# Patient Record
Sex: FEMALE | Race: WHITE | NOT HISPANIC OR LATINO | Employment: FULL TIME | URBAN - METROPOLITAN AREA
[De-identification: names, ages, dates, MRNs, and addresses within clinical notes are randomized per-mention and may not be internally consistent; named-entity substitution may affect disease eponyms.]

---

## 2024-02-26 ENCOUNTER — HOSPITAL ENCOUNTER (OUTPATIENT)
Dept: RADIOLOGY | Facility: CLINIC | Age: 54
Discharge: HOME | End: 2024-02-26
Payer: COMMERCIAL

## 2024-02-26 DIAGNOSIS — Z12.31 ENCOUNTER FOR SCREENING MAMMOGRAM FOR MALIGNANT NEOPLASM OF BREAST: ICD-10-CM

## 2024-02-26 PROCEDURE — 77067 SCR MAMMO BI INCL CAD: CPT

## 2024-02-26 PROCEDURE — 77063 BREAST TOMOSYNTHESIS BI: CPT | Performed by: RADIOLOGY

## 2024-02-26 PROCEDURE — 77067 SCR MAMMO BI INCL CAD: CPT | Performed by: RADIOLOGY

## 2024-02-29 ENCOUNTER — OFFICE VISIT (OUTPATIENT)
Dept: OBSTETRICS AND GYNECOLOGY | Facility: CLINIC | Age: 54
End: 2024-02-29
Payer: COMMERCIAL

## 2024-02-29 VITALS
HEIGHT: 63 IN | DIASTOLIC BLOOD PRESSURE: 90 MMHG | BODY MASS INDEX: 20.73 KG/M2 | WEIGHT: 117 LBS | SYSTOLIC BLOOD PRESSURE: 181 MMHG

## 2024-02-29 DIAGNOSIS — Z01.419 NORMAL GYNECOLOGIC EXAMINATION: Primary | ICD-10-CM

## 2024-02-29 PROCEDURE — 1036F TOBACCO NON-USER: CPT | Performed by: OBSTETRICS & GYNECOLOGY

## 2024-02-29 PROCEDURE — 87624 HPV HI-RISK TYP POOLED RSLT: CPT

## 2024-02-29 PROCEDURE — 88175 CYTOPATH C/V AUTO FLUID REDO: CPT

## 2024-02-29 PROCEDURE — 99396 PREV VISIT EST AGE 40-64: CPT | Performed by: OBSTETRICS & GYNECOLOGY

## 2024-02-29 RX ORDER — ESTRADIOL 1 MG/1
1 TABLET ORAL
COMMUNITY
End: 2024-03-05 | Stop reason: SDUPTHER

## 2024-02-29 RX ORDER — HYDROXYZINE HYDROCHLORIDE 25 MG/1
TABLET, FILM COATED ORAL
COMMUNITY
Start: 2023-08-28

## 2024-02-29 RX ORDER — VALACYCLOVIR HYDROCHLORIDE 500 MG/1
500 TABLET, FILM COATED ORAL DAILY
COMMUNITY
End: 2024-05-07

## 2024-02-29 ASSESSMENT — ENCOUNTER SYMPTOMS
CONSTITUTIONAL NEGATIVE: 1
HEMATOLOGIC/LYMPHATIC NEGATIVE: 1
GASTROINTESTINAL NEGATIVE: 1
RESPIRATORY NEGATIVE: 1
EYES NEGATIVE: 1
CARDIOVASCULAR NEGATIVE: 1
PSYCHIATRIC NEGATIVE: 1
MUSCULOSKELETAL NEGATIVE: 1
NEUROLOGICAL NEGATIVE: 1
ENDOCRINE NEGATIVE: 1
ALLERGIC/IMMUNOLOGIC NEGATIVE: 1

## 2024-02-29 ASSESSMENT — PAIN SCALES - GENERAL: PAINLEVEL: 0-NO PAIN

## 2024-02-29 NOTE — PROGRESS NOTES
Subjective   Patient ID: Rosalva Correa is a 53 y.o. female who presents for Annual Exam (Last pap:  12/7/2015  neg/neg/Last heidy:  1/26/2024  cat 1/Last colon screen:  4/15/2019/Shira jasso.   Carly Payne LPN).  HPI patient is here for annual visit she has no complaints    Review of Systems   Constitutional: Negative.    Eyes: Negative.    Respiratory: Negative.     Cardiovascular: Negative.    Gastrointestinal: Negative.    Endocrine: Negative.    Genitourinary: Negative.    Musculoskeletal: Negative.    Skin: Negative.    Allergic/Immunologic: Negative.    Neurological: Negative.    Hematological: Negative.    Psychiatric/Behavioral: Negative.         Objective   Physical Exam  Constitutional:       Appearance: Normal appearance.   HENT:      Head: Normocephalic and atraumatic.   Cardiovascular:      Rate and Rhythm: Normal rate and regular rhythm.      Pulses: Normal pulses.      Heart sounds: Normal heart sounds.   Pulmonary:      Effort: Pulmonary effort is normal.      Breath sounds: Normal breath sounds.   Abdominal:      General: Abdomen is flat. Bowel sounds are normal.      Palpations: Abdomen is soft.      Hernia: There is no hernia in the left inguinal area or right inguinal area.   Genitourinary:     General: Normal vulva.      Exam position: Lithotomy position.      Labia:         Right: No rash, tenderness or lesion.         Left: No rash, tenderness or lesion.       Urethra: No prolapse.      Vagina: Normal.      Cervix: Normal.      Uterus: Absent.       Adnexa: Right adnexa normal and left adnexa normal.   Musculoskeletal:      Cervical back: Normal range of motion and neck supple.   Skin:     General: Skin is warm and dry.   Neurological:      General: No focal deficit present.      Mental Status: She is alert and oriented to person, place, and time.         Assessment/Plan     Normal gynecologic examination  Pap test HPV typing  Mammogram up-to-date         Gonsalo Bartholomew MD  02/29/24 4:26 PM

## 2024-03-05 DIAGNOSIS — Z79.890 HORMONE REPLACEMENT THERAPY: Primary | ICD-10-CM

## 2024-03-06 DIAGNOSIS — B00.9 HERPESVIRAL INFECTION, UNSPECIFIED: ICD-10-CM

## 2024-03-08 RX ORDER — ESTRADIOL 1 MG/1
1 TABLET ORAL DAILY
Qty: 90 TABLET | Refills: 3 | Status: SHIPPED | OUTPATIENT
Start: 2024-03-08 | End: 2025-03-08

## 2024-03-12 DIAGNOSIS — Z86.010 PERSONAL HISTORY OF COLONIC POLYPS: ICD-10-CM

## 2024-03-12 RX ORDER — POLYETHYLENE GLYCOL 3350, SODIUM SULFATE ANHYDROUS, SODIUM BICARBONATE, SODIUM CHLORIDE, POTASSIUM CHLORIDE 236; 22.74; 6.74; 5.86; 2.97 G/4L; G/4L; G/4L; G/4L; G/4L
4000 POWDER, FOR SOLUTION ORAL ONCE
Qty: 4000 ML | Refills: 0 | Status: SHIPPED | OUTPATIENT
Start: 2024-03-12 | End: 2024-03-12

## 2024-05-07 RX ORDER — VALACYCLOVIR HYDROCHLORIDE 500 MG/1
500 TABLET, FILM COATED ORAL DAILY
Qty: 90 TABLET | Refills: 3 | Status: SHIPPED | OUTPATIENT
Start: 2024-05-07 | End: 2024-05-31

## 2024-05-24 ENCOUNTER — OFFICE VISIT (OUTPATIENT)
Dept: GASTROENTEROLOGY | Facility: EXTERNAL LOCATION | Age: 54
End: 2024-05-24
Payer: COMMERCIAL

## 2024-05-24 DIAGNOSIS — Z12.11 SPECIAL SCREENING FOR MALIGNANT NEOPLASMS, COLON: Primary | ICD-10-CM

## 2024-05-24 DIAGNOSIS — Z83.719 FAMILY HISTORY OF COLONIC POLYPS: ICD-10-CM

## 2024-05-24 DIAGNOSIS — K57.30 DIVERTICULOSIS OF LARGE INTESTINE WITHOUT DIVERTICULITIS: ICD-10-CM

## 2024-05-24 DIAGNOSIS — Z86.010 PERSONAL HISTORY OF COLONIC POLYPS: ICD-10-CM

## 2024-05-24 PROCEDURE — 45378 DIAGNOSTIC COLONOSCOPY: CPT | Performed by: INTERNAL MEDICINE

## 2024-05-30 DIAGNOSIS — B00.9 HERPESVIRAL INFECTION, UNSPECIFIED: ICD-10-CM

## 2024-05-31 RX ORDER — VALACYCLOVIR HYDROCHLORIDE 500 MG/1
500 TABLET, FILM COATED ORAL DAILY
Qty: 90 TABLET | Refills: 3 | Status: SHIPPED | OUTPATIENT
Start: 2024-05-31

## 2025-02-14 ENCOUNTER — TELEPHONE (OUTPATIENT)
Dept: OBSTETRICS AND GYNECOLOGY | Facility: CLINIC | Age: 55
End: 2025-02-14

## 2025-02-14 DIAGNOSIS — Z12.31 SCREENING MAMMOGRAM FOR BREAST CANCER: ICD-10-CM

## 2025-02-27 DIAGNOSIS — Z79.890 HORMONE REPLACEMENT THERAPY: ICD-10-CM

## 2025-02-27 RX ORDER — ESTRADIOL 1 MG/1
1 TABLET ORAL DAILY
Qty: 90 TABLET | Refills: 3 | OUTPATIENT
Start: 2025-02-27

## 2025-02-27 NOTE — TELEPHONE ENCOUNTER
----- Message from Michael Guadarrama MD sent at 4/6/2018  5:35 PM CDT -----  Please schedule with Dr. Ramirez for testing thank you Michael Guadarrama   Last seen 3 years ago

## 2025-03-04 ENCOUNTER — HOSPITAL ENCOUNTER (OUTPATIENT)
Dept: RADIOLOGY | Facility: CLINIC | Age: 55
Discharge: HOME | End: 2025-03-04
Payer: COMMERCIAL

## 2025-03-04 VITALS — HEIGHT: 63 IN | WEIGHT: 107 LBS | BODY MASS INDEX: 18.96 KG/M2

## 2025-03-04 DIAGNOSIS — Z12.31 SCREENING MAMMOGRAM FOR BREAST CANCER: ICD-10-CM

## 2025-03-04 PROCEDURE — 77067 SCR MAMMO BI INCL CAD: CPT | Performed by: RADIOLOGY

## 2025-03-04 PROCEDURE — 77063 BREAST TOMOSYNTHESIS BI: CPT | Performed by: RADIOLOGY

## 2025-03-04 PROCEDURE — 77067 SCR MAMMO BI INCL CAD: CPT

## 2025-03-10 ENCOUNTER — APPOINTMENT (OUTPATIENT)
Dept: OBSTETRICS AND GYNECOLOGY | Facility: CLINIC | Age: 55
End: 2025-03-10
Payer: COMMERCIAL

## 2025-03-10 VITALS
SYSTOLIC BLOOD PRESSURE: 138 MMHG | DIASTOLIC BLOOD PRESSURE: 72 MMHG | WEIGHT: 104.1 LBS | BODY MASS INDEX: 18.45 KG/M2 | HEIGHT: 63 IN

## 2025-03-10 DIAGNOSIS — Z01.419 NORMAL GYNECOLOGIC EXAMINATION: ICD-10-CM

## 2025-03-10 DIAGNOSIS — N95.2 VAGINAL ATROPHY: Primary | ICD-10-CM

## 2025-03-10 DIAGNOSIS — Z79.890 HORMONE REPLACEMENT THERAPY: ICD-10-CM

## 2025-03-10 PROCEDURE — 1036F TOBACCO NON-USER: CPT | Performed by: OBSTETRICS & GYNECOLOGY

## 2025-03-10 PROCEDURE — 3008F BODY MASS INDEX DOCD: CPT | Performed by: OBSTETRICS & GYNECOLOGY

## 2025-03-10 PROCEDURE — 99214 OFFICE O/P EST MOD 30 MIN: CPT | Performed by: OBSTETRICS & GYNECOLOGY

## 2025-03-10 RX ORDER — ESTRADIOL 1 MG/1
1 TABLET ORAL DAILY
Qty: 90 TABLET | Refills: 3 | Status: SHIPPED | OUTPATIENT
Start: 2025-03-10 | End: 2026-03-10

## 2025-03-10 RX ORDER — SERTRALINE HYDROCHLORIDE 50 MG/1
50 TABLET, FILM COATED ORAL
COMMUNITY
Start: 2024-12-26

## 2025-03-10 RX ORDER — LOSARTAN POTASSIUM 50 MG/1
50 TABLET ORAL
COMMUNITY
Start: 2025-01-30

## 2025-03-10 ASSESSMENT — PATIENT HEALTH QUESTIONNAIRE - PHQ9
SUM OF ALL RESPONSES TO PHQ9 QUESTIONS 1 & 2: 0
1. LITTLE INTEREST OR PLEASURE IN DOING THINGS: NOT AT ALL
2. FEELING DOWN, DEPRESSED OR HOPELESS: NOT AT ALL

## 2025-03-10 ASSESSMENT — PAIN SCALES - GENERAL: PAINLEVEL_OUTOF10: 0-NO PAIN

## 2025-03-10 ASSESSMENT — ENCOUNTER SYMPTOMS
ALLERGIC/IMMUNOLOGIC NEGATIVE: 1
NEUROLOGICAL NEGATIVE: 1
CARDIOVASCULAR NEGATIVE: 1
HEMATOLOGIC/LYMPHATIC NEGATIVE: 1
ENDOCRINE NEGATIVE: 1
PSYCHIATRIC NEGATIVE: 1
CONSTITUTIONAL NEGATIVE: 1
GASTROINTESTINAL NEGATIVE: 1
EYES NEGATIVE: 1
MUSCULOSKELETAL NEGATIVE: 1
RESPIRATORY NEGATIVE: 1

## 2025-03-10 NOTE — PROGRESS NOTES
Subjective   Patient ID: Rosalva Correa is a 54 y.o. female who presents for Annual Exam (Last pap:  2/29/2024  neg/neg./Last heidy:  3/4/2025  cat 1./Last colon screen:  5/24/2024./Declines romero.  PILAR Payne LPN).  HPI patient is here for complaints of weight loss no apparent reason although she did notice that her muscle mass in her arms is less than it used to be    Review of Systems   Constitutional: Negative.    Eyes: Negative.    Respiratory: Negative.     Cardiovascular: Negative.    Gastrointestinal: Negative.    Endocrine: Negative.    Genitourinary: Negative.    Musculoskeletal: Negative.    Skin: Negative.    Allergic/Immunologic: Negative.    Neurological: Negative.    Hematological: Negative.    Psychiatric/Behavioral: Negative.         Objective   Physical Exam  Constitutional:       Appearance: Normal appearance.   HENT:      Head: Normocephalic and atraumatic.   Cardiovascular:      Rate and Rhythm: Normal rate and regular rhythm.      Pulses: Normal pulses.      Heart sounds: Normal heart sounds.   Pulmonary:      Effort: Pulmonary effort is normal.      Breath sounds: Normal breath sounds.   Abdominal:      General: Abdomen is flat. Bowel sounds are normal.      Palpations: Abdomen is soft.      Hernia: There is no hernia in the left inguinal area or right inguinal area.   Genitourinary:     General: Normal vulva.      Exam position: Lithotomy position.      Labia:         Right: No rash, tenderness or lesion.         Left: No rash, tenderness or lesion.       Urethra: No prolapse.      Cervix: Normal.      Uterus: Absent.       Adnexa: Right adnexa normal and left adnexa normal.      Comments: Vagina atrophic  Musculoskeletal:      Cervical back: Normal range of motion and neck supple.   Skin:     General: Skin is warm and dry.   Neurological:      General: No focal deficit present.      Mental Status: She is alert and oriented to person, place, and time.         Assessment/Plan    normal  gynecologic examination  Vaginal atrophy  Estradiol          Gonsalo Bartholomew MD 03/10/25 3:47 PM

## 2025-04-09 ENCOUNTER — HOSPITAL ENCOUNTER (OUTPATIENT)
Dept: RADIOLOGY | Facility: HOSPITAL | Age: 55
Discharge: HOME | End: 2025-04-09
Payer: COMMERCIAL

## 2025-04-09 DIAGNOSIS — Z13.6 ENCOUNTER FOR SCREENING FOR CARDIOVASCULAR DISORDERS: ICD-10-CM

## 2025-04-09 PROCEDURE — 75571 CT HRT W/O DYE W/CA TEST: CPT

## 2025-05-27 DIAGNOSIS — B00.9 HERPESVIRAL INFECTION, UNSPECIFIED: ICD-10-CM

## 2025-06-06 RX ORDER — VALACYCLOVIR HYDROCHLORIDE 500 MG/1
500 TABLET, FILM COATED ORAL DAILY
Qty: 90 TABLET | Refills: 3 | Status: SHIPPED | OUTPATIENT
Start: 2025-06-06

## 2025-07-05 ENCOUNTER — APPOINTMENT (OUTPATIENT)
Dept: RADIOLOGY | Facility: HOSPITAL | Age: 55
End: 2025-07-05
Payer: COMMERCIAL

## 2025-07-05 ENCOUNTER — HOSPITAL ENCOUNTER (EMERGENCY)
Facility: HOSPITAL | Age: 55
Discharge: HOME | End: 2025-07-05
Payer: COMMERCIAL

## 2025-07-05 VITALS
HEART RATE: 81 BPM | TEMPERATURE: 98.1 F | SYSTOLIC BLOOD PRESSURE: 154 MMHG | DIASTOLIC BLOOD PRESSURE: 84 MMHG | WEIGHT: 104.5 LBS | BODY MASS INDEX: 19.23 KG/M2 | OXYGEN SATURATION: 99 % | HEIGHT: 62 IN | RESPIRATION RATE: 16 BRPM

## 2025-07-05 DIAGNOSIS — S62.660A CLOSED NONDISPLACED FRACTURE OF DISTAL PHALANX OF RIGHT INDEX FINGER, INITIAL ENCOUNTER: ICD-10-CM

## 2025-07-05 DIAGNOSIS — S63.259A DISLOCATION OF FINGER, INITIAL ENCOUNTER: Primary | ICD-10-CM

## 2025-07-05 PROCEDURE — 73140 X-RAY EXAM OF FINGER(S): CPT | Mod: RIGHT SIDE | Performed by: RADIOLOGY

## 2025-07-05 PROCEDURE — 2500000001 HC RX 250 WO HCPCS SELF ADMINISTERED DRUGS (ALT 637 FOR MEDICARE OP)

## 2025-07-05 PROCEDURE — 73140 X-RAY EXAM OF FINGER(S): CPT | Mod: RT,76

## 2025-07-05 PROCEDURE — 73140 X-RAY EXAM OF FINGER(S): CPT | Mod: RT

## 2025-07-05 PROCEDURE — 99284 EMERGENCY DEPT VISIT MOD MDM: CPT | Mod: 25

## 2025-07-05 PROCEDURE — 26770 TREAT FINGER DISLOCATION: CPT | Mod: F6

## 2025-07-05 RX ORDER — ACETAMINOPHEN 325 MG/1
975 TABLET ORAL ONCE
Status: COMPLETED | OUTPATIENT
Start: 2025-07-05 | End: 2025-07-05

## 2025-07-05 RX ORDER — IBUPROFEN 400 MG/1
400 TABLET, FILM COATED ORAL ONCE
Status: COMPLETED | OUTPATIENT
Start: 2025-07-05 | End: 2025-07-05

## 2025-07-05 RX ORDER — TRAMADOL HYDROCHLORIDE 50 MG/1
50 TABLET, FILM COATED ORAL EVERY 6 HOURS PRN
Qty: 15 TABLET | Refills: 0 | Status: SHIPPED | OUTPATIENT
Start: 2025-07-05 | End: 2025-07-08

## 2025-07-05 RX ADMIN — IBUPROFEN 400 MG: 400 TABLET, FILM COATED ORAL at 12:27

## 2025-07-05 RX ADMIN — ACETAMINOPHEN 975 MG: 325 TABLET ORAL at 12:27

## 2025-07-05 ASSESSMENT — PAIN - FUNCTIONAL ASSESSMENT
PAIN_FUNCTIONAL_ASSESSMENT: 0-10
PAIN_FUNCTIONAL_ASSESSMENT: 0-10

## 2025-07-05 ASSESSMENT — PAIN SCALES - GENERAL
PAINLEVEL_OUTOF10: 8
PAINLEVEL_OUTOF10: 2

## 2025-07-05 NOTE — ED PROCEDURE NOTE
Procedure  Orthopaedic Injury Treatment - Upper Extremity    Performed by: Ag Jo PA-C  Authorized by: Ag Jo PA-C    Consent:     Consent obtained:  Verbal    Consent given by:  Patient    Risks, benefits, and alternatives were discussed: yes      Risks discussed:  Fracture, recurrent dislocation, irreducible dislocation, nerve damage and restricted joint movement  Universal protocol:     Procedure explained and questions answered to patient or proxy's satisfaction: yes      Imaging studies available: yes      Site/side marked: yes      Immediately prior to procedure, a time out was called: yes      Patient identity confirmed:  Verbally with patient  Location:     Location:  Finger    Finger location:  R index finger    Finger dislocation type: PIP    Pre-procedure details:     Pre-procedure imaging:  X-ray    Imaging findings: dislocation present and fracture present      Distal perfusion: normal    Sedation:     Sedation type:  None  Anesthesia:     Anesthesia method:  None  Procedure details:     Manipulation performed: yes      Finger reduction method:  Direct traction    Reduction successful: yes      Reduction confirmed with imaging: yes      Immobilization:  Brace    Supplies used:  Aluminum splint  Post-procedure details:     Neurological function: normal      Distal perfusion: normal      Range of motion: improved      Procedure completion:  Tolerated well, no immediate complications               Ag Jo PA-C  07/05/25 1428

## 2025-07-05 NOTE — ED PROVIDER NOTES
HPI   Chief Complaint   Patient presents with    Finger Injury     Pt tripped and fell injuring her right index finger. Swelling noted.       Patient is a 55-year-old female presenting with right index finger injury.  She was playing pickle ball when she accidentally injured her right index finger.  Was obtained.  Right finger due to pain and swelling.  6 out of 10.  Patient denies fevers, chills, cough, sore throat, runny nose, chest pain, shortness of breath, abdominal pain, nausea, vomiting, diarrhea or urinary complaints.              Patient History   Medical History[1]  Surgical History[2]  Family History[3]  Social History[4]    Physical Exam   ED Triage Vitals [07/05/25 1149]   Temperature Heart Rate Respirations BP   36.7 °C (98.1 °F) 81 16 154/84      Pulse Ox Temp src Heart Rate Source Patient Position   99 % -- -- --      BP Location FiO2 (%)     -- --       Physical Exam  Vitals and nursing note reviewed.   Constitutional:       Appearance: She is well-developed.      Comments: Awake, sitting in examination chair   HENT:      Head: Normocephalic and atraumatic.      Nose: Nose normal.      Mouth/Throat:      Mouth: Mucous membranes are moist.      Pharynx: Oropharynx is clear.   Eyes:      Extraocular Movements: Extraocular movements intact.      Conjunctiva/sclera: Conjunctivae normal.      Pupils: Pupils are equal, round, and reactive to light.   Cardiovascular:      Rate and Rhythm: Normal rate and regular rhythm.      Pulses: Normal pulses.      Heart sounds: Normal heart sounds. No murmur heard.  Pulmonary:      Effort: Pulmonary effort is normal. No respiratory distress.      Breath sounds: Normal breath sounds.   Abdominal:      General: Abdomen is flat.      Palpations: Abdomen is soft.      Tenderness: There is no abdominal tenderness.   Musculoskeletal:         General: Swelling present.      Cervical back: Normal range of motion and neck supple.      Comments: Limited range of motion of PIP  joint as well as swelling at the PIP joint of the right index finger   Skin:     General: Skin is warm and dry.      Capillary Refill: Capillary refill takes less than 2 seconds.   Neurological:      General: No focal deficit present.      Mental Status: She is alert and oriented to person, place, and time.   Psychiatric:         Mood and Affect: Mood normal.         Behavior: Behavior normal.           ED Course & MDM   Diagnoses as of 07/05/25 1427   Dislocation of finger, initial encounter   Closed nondisplaced fracture of distal phalanx of right index finger, initial encounter                 No data recorded     Deepika Coma Scale Score: 15 (07/05/25 1150 : Vicki Hankins RN)                           Medical Decision Making  Patient is a 55-year-old female presenting with right index finger injury.  Imaging ordered.  Medication ordered.  Conditions considered include but are not limited to: Fracture, dislocation, contusion.    PIP joint dislocation and avulsion fracture.  Reduction was attempted.  After reduction, patient had significant improvement to the right index finger.  Much improved range of motion of flexion of the PIP joint.  X-ray does show improvement to dislocation.    Patient was placed in a finger splint by me.  Patient neurovascularly intact prior to and after application.  Prescription for tramadol written.  Encouraged over-the-counter medication and tramadol only if utilized if over-the-counter medications are ineffective.  I believe this patient is at low risk for complication, and a disposition of discharge is acceptable.  Return to the Emergency Department if new or worsening symptoms including headache, fever, chills, chest pain, shortness of breath, syncope, near syncope, abdominal pain, nausea, vomiting,  diarrhea, or worsening pain.  Orthopedic referral has been given as well as orthopedic clinic.  Patient is agreeable to a disposition of discharge.  To follow with respective fields  in the next several days.      Medications   ibuprofen tablet 400 mg (400 mg oral Given 7/5/25 1227)   acetaminophen (Tylenol) tablet 975 mg (975 mg oral Given 7/5/25 1227)     XR fingers right 2+ views   Final Result   Partial reduction of a posterior dislocation of the right second   proximal interphalangeal joint with improved anatomic alignment.    Small residual displaced volar avulsion fracture involving the base of   the right second middle phalanx.   Signed by Clemente Davey MD      XR fingers right 2+ views   Final Result   Posterior dislocation at the proximal interphalangeal joint with   fracture at the volar base of the middle phalanx.   Signed by Олег Evangelista MD            Procedure  Procedures       [1]   Past Medical History:  Diagnosis Date    Acute recurrent pansinusitis 03/19/2018    Acute recurrent pansinusitis    Elevated blood-pressure reading, without diagnosis of hypertension 03/06/2019    Blood pressure elevated without history of HTN    Encounter for gynecological examination (general) (routine) without abnormal findings     Encounter for routine gynecological examination    Encounter for other preprocedural examination 07/26/2016    Preoperative testing    Herpes     Impacted cerumen, unspecified ear 08/04/2014    Wax in ear    Migraine     Noninfective gastroenteritis and colitis, unspecified 08/11/2017    Acute gastroenteritis    Other abnormal glucose 01/08/2018    Elevated hemoglobin A1c    Other conditions influencing health status     History Of ___ Previous Pregnancies    Other skin changes 03/20/2015    Vesicular rash    Other specified disorders of eustachian tube, bilateral 08/04/2014    Dysfunction of both eustachian tubes    Pelvic and perineal pain 01/27/2016    Pelvic pain    Personal history of other diseases of the digestive system 08/31/2015    History of constipation    Personal history of other diseases of the female genital tract 12/30/2015    History of senile  atrophic vaginitis    Personal history of other diseases of the respiratory system     History of sore throat    Personal history of other diseases of the respiratory system 2015    History of acute sinusitis    Personal history of other medical treatment 2016    History of screening mammography    Personal history of other specified conditions 2015    History of abdominal pain    Personal history of other specified conditions 2018    History of syncope    Rash and other nonspecific skin eruption 2014    Rash    Right lower quadrant pain 09/15/2015    RLQ abdominal pain    Urinary tract infection, site not specified 2017    Acute UTI (urinary tract infection)   [2]   Past Surgical History:  Procedure Laterality Date     SECTION, CLASSIC       Section     SECTION, LOW TRANSVERSE      HYSTERECTOMY  2016    Hysterectomy    OTHER SURGICAL HISTORY  2013    Complex Repair Of Wound Nose    REFRACTIVE SURGERY  2016    Corneal LASIK   [3]   Family History  Problem Relation Name Age of Onset    Breast cancer Maternal Grandmother Alvarez 25    Cancer Maternal Grandmother Alvarez     Hypertension Father Donald     Alcohol abuse Maternal Grandfather Sonia     Hearing loss Maternal Grandfather Sonia    [4]   Social History  Tobacco Use    Smoking status: Never    Smokeless tobacco: Never   Vaping Use    Vaping status: Never Used   Substance Use Topics    Alcohol use: Not Currently     Alcohol/week: 1.0 standard drink of alcohol     Types: 1 Cans of beer per week    Drug use: Never        Ag Jo PA-C  25 1950

## 2025-07-05 NOTE — Clinical Note
Rosalva Correa was seen and treated in our emergency department on 7/5/2025.  She may return to work on 07/08/2025.       If you have any questions or concerns, please don't hesitate to call.      Ag Jo PA-C

## 2025-07-05 NOTE — DISCHARGE INSTRUCTIONS
Please keep the finger splint on.  Utilize over-the-counter medications, ice and elevation.  You only utilize tramadol as needed if over-the-counter medication is ineffective.  Follow with orthopedics.  2 separate referrals given.  Be sure to take all medications, over the counter medications or prescription medications only as directed.    Be sure to follow up as directed in 1-2 days. All of the details of your follow up instructions are detailed in the follow up section of this packet.    If you are being discharged with any pains medications or muscle relaxers (norco, Vicodin, hydrocodone products, Percocet, oxycodone products, flexeril, cyclobenzaprine, robaxin, norflex, brand or generic, or any other pain controlling medications with the exception of Ibuprofen and regular Tylenol, do not drive or operate machinery, climb ladders or participate in any activity that could potentially put yourself or others at risk should you get dizzy, or be/feel impaired at all.    Return to emergency room without delay for ANY new or worsening pains or for any other symptoms or concerns. Return with worsening pains, nausea, vomiting, trouble breathing, palpitations, shortness of breath, inability to pass stool or urine, loss of control of stool or urine, any numbness or tingling (that is not normal for you), uncontrolled fevers, the passing of blood or other material in stool or urine, rashes, pains or for any other symptoms or concerns you may have. You are always welcome to return to the ER at any time for any reason or for any other concerns you may have.

## 2025-07-08 ENCOUNTER — OFFICE VISIT (OUTPATIENT)
Dept: ORTHOPEDIC SURGERY | Facility: CLINIC | Age: 55
End: 2025-07-08
Payer: COMMERCIAL

## 2025-07-08 ENCOUNTER — TELEPHONE (OUTPATIENT)
Dept: ORTHOPEDIC SURGERY | Facility: CLINIC | Age: 55
End: 2025-07-08

## 2025-07-08 DIAGNOSIS — S62.629A AVULSION FRACTURE OF MIDDLE PHALANX OF FINGER, CLOSED, INITIAL ENCOUNTER: Primary | ICD-10-CM

## 2025-07-08 DIAGNOSIS — S63.289A DISLOCATION OF PROXIMAL INTERPHALANGEAL JOINT OF FINGER, INITIAL ENCOUNTER: ICD-10-CM

## 2025-07-08 PROCEDURE — 99203 OFFICE O/P NEW LOW 30 MIN: CPT

## 2025-07-08 PROCEDURE — 1036F TOBACCO NON-USER: CPT

## 2025-07-08 ASSESSMENT — PAIN SCALES - GENERAL: PAINLEVEL_OUTOF10: 6

## 2025-07-08 ASSESSMENT — PAIN - FUNCTIONAL ASSESSMENT: PAIN_FUNCTIONAL_ASSESSMENT: 0-10

## 2025-07-08 NOTE — H&P (VIEW-ONLY)
HPI  Rosalva Correa is a 55 y.o. female  in office today for   Chief Complaint   Patient presents with    Right Hand - Pain, Injury     Was playing pickleball Saturday morning and jammed finger   . This is her index finger.  she went to the ED at the time as the finger was dislocated.  They were able to reduce it.  Also has a volar plate fracture. She arrived in a splint with finger held in extension, tylenol for pain.  Can't take NSAIDs due to BP medication. This is her dominant hand.    Past Medical History: no significant history    Medication  Medications Ordered Prior to Encounter[1]    Physical Exam  Constitutional: well developed, well nourished female in no acute distress  Psychiatric: normal mood, appropriate affect  Eyes: sclera anicteric  HENT: normocephalic/atraumatic  CV: regular rate and rhythm   Respiratory: non labored breathing  Integumentary: no rash  Neurological: moves all extremities    Right Hand Exam     Tenderness   Right hand tenderness location: index finger.    Other   Sensation: normal    Comments:  Splint left in place to hold the reduction.              Imaging/Lab:  X-rays were taken 7/5/25 which were reviewed by myself and read by radiology and show before reduction: A posterior dislocation at the proximal interphalangeal joint is seen  with a fracture at the volar base of the middle phalanx.  No bony defect is seen.  There is associated soft tissue swelling.  Post Reduction: There is partial reduction of a posterior dislocation at the right second proximal interphalangeal joint associated with improved anatomic alignment.  There is mild residual posterior displacement of the middle phalanx associated with a small acute proximal avulsion fracture along the volar and radial aspect. There is mild to moderate adjacent soft tissue swelling.      Assessment  Assessment: right index finger dislocation with post reduction subluxation and volar plate avulsion fracture    Plan  Plan:  History,  physical exam, and imaging were reviewed with patient. Reviewed imaging with Dr Ovalle who recommends surgical fixation to better reduce the dislocation and treatment of the volar fracture.  Discussed risks, benefits, outcomes for surgical vs nonsurgical treatment and she would like to proceed with fixation.  Procedure request entered by myself.  Consent to be signed day of surgery.  In the meantime, splint left in place to protect the partial reduction.  RICE and Tylenol as needed for pain.  She should be NWB with the index finger.  Follow Up: She will be scheduled for surgery for next week.    All questions were answered for the patient prior to end of exam and patient addressed their understanding.    Nohemi Muñiz PA-C  07/08/25         [1]   Current Outpatient Medications on File Prior to Visit   Medication Sig Dispense Refill    estradiol (Estrace) 1 mg tablet Take 1 tablet (1 mg) by mouth once daily. 90 tablet 3    hydrOXYzine HCL (Atarax) 25 mg tablet TAKE 1 TABLET BY MOUTH 2 HOURS BEFORE BEDTIME AS NEEDED FOR ITCHING      losartan (Cozaar) 50 mg tablet Take 1 tablet (50 mg) by mouth once daily.      sertraline (Zoloft) 50 mg tablet Take 1 tablet (50 mg) by mouth once daily.      traMADol (Ultram) 50 mg tablet Take 1 tablet (50 mg) by mouth every 6 hours if needed for severe pain (7 - 10) for up to 3 days. 15 tablet 0    valACYclovir (Valtrex) 500 mg tablet TAKE 1 TABLET BY MOUTH EVERY DAY 90 tablet 3     No current facility-administered medications on file prior to visit.

## 2025-07-08 NOTE — TELEPHONE ENCOUNTER
Left message. Patient needs an appointment with [facility] for rt 2nd finger injury.

## 2025-07-09 ENCOUNTER — ANESTHESIA EVENT (OUTPATIENT)
Dept: OPERATING ROOM | Facility: HOSPITAL | Age: 55
End: 2025-07-09
Payer: COMMERCIAL

## 2025-07-11 ENCOUNTER — PRE-ADMISSION TESTING (OUTPATIENT)
Dept: PREADMISSION TESTING | Facility: HOSPITAL | Age: 55
End: 2025-07-11
Payer: COMMERCIAL

## 2025-07-11 ENCOUNTER — TELEPHONE (OUTPATIENT)
Dept: ORTHOPEDIC SURGERY | Facility: CLINIC | Age: 55
End: 2025-07-11

## 2025-07-11 VITALS
HEART RATE: 87 BPM | DIASTOLIC BLOOD PRESSURE: 80 MMHG | BODY MASS INDEX: 19.72 KG/M2 | OXYGEN SATURATION: 97 % | TEMPERATURE: 98.1 F | SYSTOLIC BLOOD PRESSURE: 181 MMHG | HEIGHT: 62 IN | WEIGHT: 107.14 LBS | RESPIRATION RATE: 16 BRPM

## 2025-07-11 DIAGNOSIS — S62.629A AVULSION FRACTURE OF MIDDLE PHALANX OF FINGER, CLOSED, INITIAL ENCOUNTER: ICD-10-CM

## 2025-07-11 DIAGNOSIS — S63.289A DISLOCATION OF PROXIMAL INTERPHALANGEAL JOINT OF FINGER, INITIAL ENCOUNTER: ICD-10-CM

## 2025-07-11 DIAGNOSIS — Z01.818 PREOPERATIVE EXAMINATION: Primary | ICD-10-CM

## 2025-07-11 LAB
ANION GAP SERPL CALC-SCNC: 11 MMOL/L (ref 10–20)
BUN SERPL-MCNC: 15 MG/DL (ref 6–23)
CALCIUM SERPL-MCNC: 9.3 MG/DL (ref 8.6–10.3)
CHLORIDE SERPL-SCNC: 101 MMOL/L (ref 98–107)
CO2 SERPL-SCNC: 24 MMOL/L (ref 21–32)
CREAT SERPL-MCNC: 0.74 MG/DL (ref 0.5–1.05)
EGFRCR SERPLBLD CKD-EPI 2021: >90 ML/MIN/1.73M*2
ERYTHROCYTE [DISTWIDTH] IN BLOOD BY AUTOMATED COUNT: 12.4 % (ref 11.5–14.5)
GLUCOSE SERPL-MCNC: 100 MG/DL (ref 74–99)
HCT VFR BLD AUTO: 39.3 % (ref 36–46)
HGB BLD-MCNC: 13.3 G/DL (ref 12–16)
MCH RBC QN AUTO: 32.5 PG (ref 26–34)
MCHC RBC AUTO-ENTMCNC: 33.8 G/DL (ref 32–36)
MCV RBC AUTO: 96 FL (ref 80–100)
NRBC BLD-RTO: 0 /100 WBCS (ref 0–0)
PLATELET # BLD AUTO: 221 X10*3/UL (ref 150–450)
POTASSIUM SERPL-SCNC: 4.1 MMOL/L (ref 3.5–5.3)
RBC # BLD AUTO: 4.09 X10*6/UL (ref 4–5.2)
SODIUM SERPL-SCNC: 132 MMOL/L (ref 136–145)
WBC # BLD AUTO: 8.8 X10*3/UL (ref 4.4–11.3)

## 2025-07-11 PROCEDURE — 85027 COMPLETE CBC AUTOMATED: CPT

## 2025-07-11 PROCEDURE — 80048 BASIC METABOLIC PNL TOTAL CA: CPT

## 2025-07-11 PROCEDURE — 36415 COLL VENOUS BLD VENIPUNCTURE: CPT

## 2025-07-11 PROCEDURE — 99204 OFFICE O/P NEW MOD 45 MIN: CPT | Performed by: NURSE PRACTITIONER

## 2025-07-11 RX ORDER — MAGNESIUM 250 MG
TABLET ORAL
COMMUNITY

## 2025-07-11 RX ORDER — MV-MIN/VIT C/GLUT/LYSINE/HB124 1000-50 MG
TABLET, EFFERVESCENT ORAL
COMMUNITY

## 2025-07-11 RX ORDER — ASCORBIC ACID 250 MG
250 TABLET ORAL DAILY
COMMUNITY

## 2025-07-11 RX ORDER — CHOLECALCIFEROL (VITAMIN D3) 50 MCG
50 TABLET ORAL DAILY
COMMUNITY

## 2025-07-11 ASSESSMENT — ENCOUNTER SYMPTOMS
CONSTITUTIONAL NEGATIVE: 1
GASTROINTESTINAL NEGATIVE: 1
MUSCULOSKELETAL NEGATIVE: 1
CARDIOVASCULAR NEGATIVE: 1
EYES NEGATIVE: 1
RESPIRATORY NEGATIVE: 1
NEUROLOGICAL NEGATIVE: 1

## 2025-07-11 ASSESSMENT — DUKE ACTIVITY SCORE INDEX (DASI)
CAN YOU DO HEAVY WORK AROUND THE HOUSE LIKE SCRUBBING FLOORS OR LIFTING AND MOVING HEAVY FURNITURE: YES
DASI METS SCORE: 9.9
CAN YOU HAVE SEXUAL RELATIONS: YES
CAN YOU RUN A SHORT DISTANCE: YES
CAN YOU PARTICIPATE IN MODERATE RECREATIONAL ACTIVITIES LIKE GOLF, BOWLING, DANCING, DOUBLES TENNIS OR THROWING A BASEBALL OR FOOTBALL: YES
CAN YOU CLIMB A FLIGHT OF STAIRS OR WALK UP A HILL: YES
CAN YOU TAKE CARE OF YOURSELF (EAT, DRESS, BATHE, OR USE TOILET): YES
CAN YOU PARTICIPATE IN STRENOUS SPORTS LIKE SWIMMING, SINGLES TENNIS, FOOTBALL, BASKETBALL, OR SKIING: YES
CAN YOU WALK INDOORS, SUCH AS AROUND YOUR HOUSE: YES
TOTAL_SCORE: 58.2
CAN YOU DO LIGHT WORK AROUND THE HOUSE LIKE DUSTING OR WASHING DISHES: YES
CAN YOU WALK A BLOCK OR TWO ON LEVEL GROUND: YES
CAN YOU DO YARD WORK LIKE RAKING LEAVES, WEEDING OR PUSHING A MOWER: YES
CAN YOU DO MODERATE WORK AROUND THE HOUSE LIKE VACUUMING, SWEEPING FLOORS OR CARRYING GROCERIES: YES

## 2025-07-11 ASSESSMENT — PAIN SCALES - GENERAL: PAINLEVEL_OUTOF10: 0 - NO PAIN

## 2025-07-11 ASSESSMENT — PAIN - FUNCTIONAL ASSESSMENT: PAIN_FUNCTIONAL_ASSESSMENT: 0-10

## 2025-07-11 ASSESSMENT — LIFESTYLE VARIABLES: SMOKING_STATUS: NONSMOKER

## 2025-07-11 NOTE — PREPROCEDURE INSTRUCTIONS
Thank you for visiting Preadmission Testing (PAT) today for your pre-procedure evaluation, you were seen by     Ita Negro CNP  Pre Admission Testing  [facility]  [phone]    This summary includes instructions and information to aid you during your perioperative period.  Please read carefully. If you have any questions about your visit today, please call the number listed above.  If you become ill or have any changes to your health before your surgery, please contact your primary care provider and alert your surgeon.        Preparing for your Surgery       Exercises  Preoperative Deep Breathing Exercises  Why it is important to do deep breathing exercises before my surgery?  Deep breathing exercises strengthen your breathing muscles.  This helps you to recover after your surgery and decreases the chance of breathing complications.  How are the deep breathing exercises done?  Sit straight with your back supported.  Breathe in deeply and slowly through your nose. Your lower rib cage should expand and your abdomen may move forward.  Hold that breath for 3 to 5 seconds.  Breathe out through pursed lips, slowly and completely.  Rest and repeat 10 times every hour while awake.  Rest longer if you become dizzy or lightheaded.       Preoperative Brain Exercises    What are brain exercises?  A brain exercise is any activity that engages your thinking (cognitive) skills.    What types of activities are considered brain exercises?  Jigsaw puzzles, crossword puzzles, word jumble, memory games, word search, and many more.  Many can be found free online or on your phone via a mobile eddie.    Why should I do brain exercises before my surgery?  More recent research has shown brain exercise before surgery can lower the risk of postoperative delirium (confusion) which can be especially important for older adults.  Patients who did brain exercises for 5 to 10 hours the days before surgery, cut their  risk of postoperative delirium in half up to 1 week after surgery.    Sit-to-Stand Exercise    What is the sit-to-stand exercise?  The sit-to-stand exercise strengthens the muscles of your lower body and muscles in the center of your body (core muscles for stability) helping to maintain and improve your strength and mobility.  How do I do the sit-to-stand exercise?  The goal is to do this exercise without using your arms or hands.  If this is too difficult, use your arms and hands or a chair with armrests to help slowly push yourself to the standing position and lower yourself back to the sitting position. As the movement becomes easier use your arms and hands less.    Steps to the sit-to-stand exercise  Sit up tall in a sturdy chair, knees bent, feet flat on the floor shoulder-width apart.  Shift your hips/pelvis forward in the chair to correctly position yourself for the next movement.  Lean forward at your hips.  Stand up straight putting equal weight on both feet.  Check to be sure you are properly aligned with the chair, in a slow controlled movement sit back down.  Repeat this exercise 10-15 times.  If needed you can do it fewer times until your strength improves.  Rest for 1 minute.  Do another 10-15 sit-to-stand exercises.  Try to do this in the morning and evening.        Instructions    Preoperative Fasting Guidelines    Why must I stop eating and drinking near surgery time?  With sedation, food or liquid in your stomach can enter your lungs causing serious complications  Food can increase nausea and vomiting  When do I need to stop eating and drinking before my surgery?      Do not eat any food after midnight the night before your surgery/procedure. You may have up to 13.5 ounces of clear liquid until TWO hours before your instructed arrival time to the hospital.  This includes water, black tea/coffee, (no milk or cream) apple juice, and electrolyte drinks (Gatorade). You may chew gum until TWO hours  before your surgery/procedure            Simple things you can do to help prevent blood clots     Blood clots are blockages that can form in the body's veins. When a blood clot forms in your deep veins, it may be called a deep vein thrombosis, or DVT for short. Blood clots can happen in any part of the body where blood flows, but they are most common in the arms and legs. If a piece of a blood clot breaks free and travels to the lungs, it is called a pulmonary embolus (PE). A PE can be a very serious problem.         Being in the hospital or having surgery can raise your chances of getting a blood clot because you may not be well enough to move around as much as you normally do.         Ways you can help prevent blood clots in the hospital       Wearing SCDs  SCDs stands for Sequential Compression Devices.   SCDs are special sleeves that wrap around your legs. They attach to a pump that fills them with air to gently squeeze your legs every few minutes.  This helps return the blood in your legs to your heart.   SCDs should only be taken off when walking or bathing. SCDs may not be comfortable, but they can help save your life.              Pump SCD leg sleeves  Wearing compression stockings - if your doctor orders them. These special snug-fitting stockings gently squeeze your legs to help blood flow.       Walking. Walking helps move the blood in your legs.   If your doctor says it is ok, try walking the halls at least   5 times a day. Ask us to help you get up, so you don't fall.      Taking any blood-thinning medicines your doctor orders.              Ways you can help prevent blood clots at home         Wearing compression stockings - if your doctor orders them.   Walking - to help move the blood in your legs.    Taking any blood-thinning medicines your doctor orders.      Signs of a blood clot or PE    Tell your doctor or nurse right away if you have any of the problems listed below.         If you are at home,  seek medical care right away. Call 911 for chest pain or problems breathing.            Signs of a blood clot (DVT) - such as pain, swelling, redness, or warmth in your arm or legs.  Signs of a pulmonary embolism (PE) - such as chest pain or feeling short of breath      Tobacco and Alcohol;  Do not drink alcohol or smoke within 24 hours of surgery.  It is best to quit smoking for as long as possible before any surgery or procedure.        The Week before Surgery        Seven days before Surgery  Check your PAT medication instructions  Do the exercises provided to you by PAT  Arrange for a responsible, adult licensed  to take you home after surgery and stay with you for 24 hours.  You will not be permitted to drive yourself home if you have received any anesthetic/sedation  Six days before surgery  Check your PAT medication instructions  Do the exercises provided to you by PAT  Start using Chlorhexidene (CHG) body wash if prescribed  Five days before surgery  Check your PAT medication instructions  Do the exercises provided to you by PAT  Continue to use CHG body wash if prescribed  Three days before surgery  Check your PAT medication instructions  Do the exercises provided to you by PAT  Continue to use CHG body wash if prescribed  Two days before surgery  Check your PAT medication instructions  Do the exercises provided to you by PAT  Continue to use CHG body wash if prescribed    The Day before Surgery       Check your PAT medication and all other PAT instructions including when to stop eating and drinking  You will be called with your arrival time for surgery in the late afternoon.  If you do not receive a call please reach out to [facility]. [phone]  Do not smoke or drink 24 hours before surgery  Prepare items to bring with you to the hospital  Shower with your chlorhexidine wash if prescribed  Brush your teeth and use your chlorhexidine dental rinse if prescribed    The Day of Surgery       Check  your PAT medication instructions  Ensure you follow the instructions for when to stop eating and drinking  Shower, if prescribed use CHG.  Do not apply any lotions, creams, moisturizers, perfume or deodorant  Brush your teeth and use your CHG dental rinse if prescribed  Wear loose comfortable clothing  Avoid make-up  Remove  jewelry and piercings, consider professional piercing removal with a plastic spacer if needed  Bring photo ID and Insurance card  Bring an accurate medication list that includes medication dose, frequency and allergies  Bring a copy of your advanced directives (will, health care power of )  Bring any devices and controllers as well as medical devices you have been provided with for surgery (CPAP, slings, braces, etc.)  Dentures, eyeglasses, and contacts will be removed before surgery, please bring cases for contacts or glasses

## 2025-07-11 NOTE — TELEPHONE ENCOUNTER
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    Please call patient.

## 2025-07-11 NOTE — CPM/PAT H&P
CPM/PAT Evaluation       Name: Rosalva Correa (Rosalva Correa)  /Age: 1970/55 y.o.     Visit Type:   In-Person       Chief Complaint: Avulsion fracture of middle flank of finger    HPI 54 y/o female scheduled for ORIF of the hand on 2025 with  Dr. Ovalle secondary to Avulsion fracture of middle flank of finger.  PMHX includes HTN, anxiety .  PAT is consulted today for perioperative risk stratification and optimization.     Medical History[1]    Surgical History[2]    Patient  reports being sexually active and has had partner(s) who are male. She reports using the following method of birth control/protection: None.    Family History[3]    Allergies[4]    Prior to Admission medications    Medication Sig Start Date End Date Taking? Authorizing Provider   estradiol (Estrace) 1 mg tablet Take 1 tablet (1 mg) by mouth once daily. 3/10/25 3/10/26  Gonsalo Bartholomew MD   hydrOXYzine HCL (Atarax) 25 mg tablet TAKE 1 TABLET BY MOUTH 2 HOURS BEFORE BEDTIME AS NEEDED FOR ITCHING 23   Historical Provider, MD   losartan (Cozaar) 50 mg tablet Take 1 tablet (50 mg) by mouth once daily. 25   Historical Provider, MD   sertraline (Zoloft) 50 mg tablet Take 1 tablet (50 mg) by mouth once daily. 24   Historical Provider, MD   traMADol (Ultram) 50 mg tablet Take 1 tablet (50 mg) by mouth every 6 hours if needed for severe pain (7 - 10) for up to 3 days. 25  Ag Jo PA-C   valACYclovir (Valtrex) 500 mg tablet TAKE 1 TABLET BY MOUTH EVERY DAY 25   Gonsalo Bartholomew MD        [facility] ROS:   Constitutional:   neg    Neuro/Psych:   neg    Eyes:   neg    Ears:   neg    Nose:   Mouth:   Throat:   Neck:   Cardio:   neg    Respiratory:   neg    Endocrine:   GI:   neg    :   neg    Musculoskeletal:   neg    Hematologic:   Skin:      Physical Exam  Constitutional:       Appearance: Normal appearance.   HENT:      Head: Normocephalic and atraumatic.      Mouth/Throat:      Mouth: Mucous membranes are  "moist.   Eyes:      Extraocular Movements: Extraocular movements intact.   Cardiovascular:      Rate and Rhythm: Normal rate and regular rhythm.   Pulmonary:      Effort: Pulmonary effort is normal.      Breath sounds: Normal breath sounds.   Musculoskeletal:         General: Normal range of motion.      Cervical back: Normal range of motion.   Skin:     General: Skin is warm and dry.   Neurological:      General: No focal deficit present.      Mental Status: She is alert and oriented to person, place, and time.   Psychiatric:         Mood and Affect: Mood normal.         Behavior: Behavior normal.          Airway    Testing/Diagnostic:     Patient Specialist/PCP:     Visit Vitals  BP (!) 181/80 Comment: 181/92  pt axious at doctor's office   Pulse 87   Temp 36.7 °C (98.1 °F) (Temporal)   Resp 16   Ht 1.575 m (5' 2\")   Wt 48.6 kg (107 lb 2.3 oz)   SpO2 97%   BMI 19.60 kg/m²   OB Status Hysterectomy   Smoking Status Never   BSA 1.46 m²       DASI Risk Score      Flowsheet Row Pre-Admission Testing from 7/11/2025 in [facility]  Most recent reading at 7/11/2025  2:04 PM Questionnaire Series Submission from 7/11/2025 in [facility] OR with Generic Provider Thai  Most recent reading at 7/11/2025 12:38 PM   Can you take care of yourself (eat, dress, bathe, or use toilet)?  2.75 filed at 07/11/2025 1404 2.75  filed at 07/11/2025 1238   Can you walk indoors, such as around your house? 1.75 filed at 07/11/2025 1404 1.75  filed at 07/11/2025 1238   Can you walk a block or two on level ground?  2.75 filed at 07/11/2025 1404 2.75  filed at 07/11/2025 1238   Can you climb a flight of stairs or walk up a hill? 5.5 filed at 07/11/2025 1404 5.5  filed at 07/11/2025 1238   Can you run a short distance? 8 filed at 07/11/2025 1404 8  filed at 07/11/2025 1238   Can you do light work around the house like dusting or washing dishes? 2.7 filed at 07/11/2025 1404 2.7  filed at 07/11/2025 1238   Can you do " moderate work around the house like vacuuming, sweeping floors or carrying groceries? 3.5 filed at 07/11/2025 1404 3.5  filed at 07/11/2025 1238   Can you do heavy work around the house like scrubbing floors or lifting and moving heavy furniture?  8 filed at 07/11/2025 1404 8  filed at 07/11/2025 1238   Can you do yard work like raking leaves, weeding or pushing a mower? 4.5 filed at 07/11/2025 1404 4.5  filed at 07/11/2025 1238   Can you have sexual relations? 5.25 filed at 07/11/2025 1404 5.25  filed at 07/11/2025 1238   Can you participate in moderate recreational activities like golf, bowling, dancing, doubles tennis or throwing a baseball or football? 6 filed at 07/11/2025 1404 6  filed at 07/11/2025 1238   Can you participate in strenous sports like swimming, singles tennis, football, basketball, or skiing? 7.5 filed at 07/11/2025 1404 7.5  filed at 07/11/2025 1238   DASI SCORE 58.2 filed at 07/11/2025 1404 58.2  filed at 07/11/2025 1238   METS Score (Will be calculated only when all the questions are answered) 9.9 filed at 07/11/2025 1404 9.9  filed at 07/11/2025 1238          Capshani DVT Assessment      Flowsheet Row Pre-Admission Testing from 7/11/2025 in [facility]   DVT Score (IF A SCORE IS NOT CALCULATING, MUST SELECT A BMI TO COMPLETE) 3 filed at 07/11/2025 1424   Surgical Factors Minor surgery planned filed at 07/11/2025 1424   BMI (BMI MUST BE CHOSEN) 30 or less filed at 07/11/2025 1424          Modified Frailty Index    No data to display       [id number] Stroke Risk Points  Current as of 4 minutes ago        N/A 0 to 9 Points:      Last Change: N/A          The [id number] risk score (Lip SHERRY, et al. 2009. © 2010 American College [employer]) quantifies the risk of stroke for a patient with atrial fibrillation. For patients without atrial fibrillation or under the age of 18 this score appears as N/A. Higher score values generally indicate higher risk of stroke.        This  score is not applicable to this patient. Components are not calculated.          Revised Cardiac Risk Index      Flowsheet Row Pre-Admission Testing from 7/11/2025 in [facility]   High-Risk Surgery (Intraperitoneal, Intrathoracic,Suprainguinal vascular) 0 filed at 07/11/2025 1434   History of ischemic heart disease (History of MI, History of positive exercuse test, Current chest paint considered due to myocardial ischemia, Use of nitrate therapy, ECG with pathological Q Waves) 0 filed at 07/11/2025 1434   History of congestive heart failure (pulmonary edemia, bilateral rales or S3 gallop, Paroxysmal nocturnal dyspnea, CXR showing pulmonary vascular redistribution) 0 filed at 07/11/2025 1434   History of cerebrovascular disease (Prior TIA or stroke) 0 filed at 07/11/2025 1434   Pre-operative insulin treatment 0 filed at 07/11/2025 1434   Pre-operative creatinine>2 mg/dl 0 filed at 07/11/2025 1434   Revised Cardiac Risk Calculator 0 filed at 07/11/2025 1434          Apfel Simplified Score      Flowsheet Row Pre-Admission Testing from 7/11/2025 in [facility]   Smoking status 1 filed at 07/11/2025 1435   History of motion sickness or PONV  0 filed at 07/11/2025 1435   Use of postoperative opioids 1 filed at 07/11/2025 1435   Gender - Female 1=Yes filed at 07/11/2025 1435   Apfel Simplified Score Calculator 3 filed at 07/11/2025 1435          Risk Analysis Index Results This Encounter    No data found in the last 10 encounters.       Stop Bang Score      Flowsheet Row Pre-Admission Testing from 7/11/2025 in [facility]  Most recent reading at 7/11/2025  2:04 PM Questionnaire Series Submission from 7/11/2025 in [facility] OR with Generic Provider Thai  Most recent reading at 7/11/2025 12:38 PM   Do you snore loudly? 0 filed at 07/11/2025 1404 0  filed at 07/11/2025 1238   Do you often feel tired or fatigued after your sleep? 0 filed at 07/11/2025 1404 0  filed at  07/11/2025 1238   Has anyone ever observed you stop breathing in your sleep? 0 filed at 07/11/2025 1404 0  filed at 07/11/2025 1238   Do you have or are you being treated for high blood pressure? 1 filed at 07/11/2025 1404 1  filed at 07/11/2025 1238   Recent BMI (Calculated) 19.1 filed at 07/11/2025 1404 19.1  filed at 07/11/2025 1238   Is BMI greater than 35 kg/m2? 0=No filed at 07/11/2025 1404 0=No  filed at 07/11/2025 1238   Age older than 50 years old? 1=Yes filed at 07/11/2025 1404 1=Yes  filed at 07/11/2025 1238   Is your neck circumference greater than 17 inches (Male) or 16 inches (Female)? 0 filed at 07/11/2025 1404 --   Gender - Male 0=No filed at 07/11/2025 1404 0=No  filed at 07/11/2025 1238   STOP-BANG Total Score 2 filed at 07/11/2025 1404 --          Prodigy: High Risk  Total Score: 0          ARISCAT Score for Postoperative Pulmonary Complications      Flowsheet Row Pre-Admission Testing from 7/11/2025 in [facility]   Age Calculated Score 3 filed at 07/11/2025 1435   Preoperative SpO2 0 filed at 07/11/2025 1435   Respiratory infection in the last month Either upper or lower (i.e., URI, bronchitis, pneumonia), with fever and antibiotic treatment 0 filed at 07/11/2025 1435   Preoperative anemia (Hgb less than 10 g/dl) 0 filed at 07/11/2025 1435   Surgical incision  0 filed at 07/11/2025 1435   Duration of surgery  0 filed at 07/11/2025 1435   Emergency Procedure  0 filed at 07/11/2025 1435   ARISCAT Total Score  3 filed at 07/11/2025 1435          [place] Perioperative Risk for Myocardial Infarction or Cardiac Arrest (ANTONIO)      Flowsheet Row Pre-Admission Testing from 7/11/2025 in [facility]   Calculated Age Score 1.12 filed at 07/11/2025 1435   Functional Status  0 filed at 07/11/2025 1435   ASA Class  -3.29 filed at 07/11/2025 1435   Creatinine 0 filed at 07/11/2025 1435   Type of Procedure  0.80 filed at 07/11/2025 1435   ANTONIO Total Score  -6.62 filed at 07/11/2025 1437    ANTONIO % 0.13 filed at 07/11/2025 1435            Assessment and Plan:       HPI 56 y/o female scheduled for ORIF of the hand on 7/16/2025 with  Dr. Ovalle secondary to Avulsion fracture of middle flank of finger.  PMHX includes HTN, anxiety .  PAT is consulted today for perioperative risk stratification and optimization.         Neuro:  No neurologic diagnosis or significant findings on chart review, clinical presentation and evaluation.  No grossly apparent neurologic perioperative risk.    Patient is not at increased risk for perioperative CVA    Anxiety  Continue Atarax and Zoloft    HEENT:  No HEENT diagnosis or significant findings on chart review or clinical presentation and evaluation. No further preoperative testing/intervention indicated at this time.    Cardiovascular:  Follows with Dr. Ellis and was seen 1/2025 for follow up   ECHO 1/2025: EF 70%; mild LVH, DD  NST 1/2025:  negative for ischemia  BP elevated during PAT - per Pt - normally runs 120's when checked at home   Losartan day of procedure  METS: 9.9  RCRI: 0 points, 3.9%  risk for postoperative MACE       Pulmonary:  No pulmonary diagnosis or significant findings on chart review or clinical presentation.  No further preoperative testing is indicated at this time.    Stop Bang score is 2 placing patient at low risk for SPRING  PRODIGY: Low risk for opioid induced respiratory depression  Pumonary education discussed, patient also provided deep breathing exerciseswith educational handout    Renal:   No renal diagnosis, however patient is at increase risk for perioperative renal complications secondary to  Age equal to or greater than 56, HTN, use of an ace, arb, or NSAID      Endocrine:  No endocrine diagnosis or significant findings on chart review or clinical presentation and evaluation. No further testing or intervention is indicated at this time.    Hematologic:  No hematologic diagnosis, however patient is at an increased risk for DVT  Caprini  Score 3, patient at Moderate risk for perioperative DVT.  Patient provided with VTE education/handout.    Gastrointestinal:   No GI diagnosis or significant findings on chart review or clinical presentation and evaluation.   Apfel 3    Orthopaedic Surgery  Seen by Nohemi AMBRIZ on 7/8/2025 for avulsion fracture of middle finger  Plan for ORIF of the hand     Infectious disease:   No infectious diagnosis or significant findings on chart review or clinical presentation and evaluation.       Musculoskeletal:   No diagnosis or significant findings on chart review or clinical presentation and evaluation.     Anesthesia/Airway:  No anesthesia complications      Medication instructions and NPO guidelines reviewed with the patient.  All questions or concerns discussed and addressed.      Labs ordered                      [1]   Past Medical History:  Diagnosis Date    Acute gastroenteritis 08/11/2017    Acute recurrent pansinusitis 03/19/2018    Anxiety     Avulsion fracture of middle phalanx of finger, closed, initial encounter 07/05/2025    Dislocation of proximal interphalangeal joint of finger, initial encounter 07/05/2025    Dysfunction of both eustachian tubes 08/04/2014    Elevated blood-pressure reading, without diagnosis of hypertension 03/06/2019    Blood pressure elevated without history of HTN    Elevated hemoglobin A1c 01/08/2018    Encounter for gynecological examination (general) (routine) without abnormal findings     Encounter for routine gynecological examination    Encounter for other preprocedural examination 07/26/2016    Preoperative testing    Herpes     History of abdominal pain 08/31/2015    History of acute sinusitis 12/31/2015    History of constipation 08/31/2015    History of screening mammography 12/16/2016    History of senile atrophic vaginitis 12/30/2015    History of sore throat     History of syncope 03/05/2018    Impacted cerumen, unspecified ear 08/04/2014    Wax in ear    LVH (left  ventricular hypertrophy)     Migraine     Other conditions influencing health status     History Of ___ Previous Pregnancies    Pelvic pain 2016    Primary hypertension     Rash and other nonspecific skin eruption 2014    Rash    Right lower quadrant pain 09/15/2015    RLQ abdominal pain    Urinary tract infection, site not specified 2017    Acute UTI (urinary tract infection)    Vesicular rash 2015    Vitamin D deficiency    [2]   Past Surgical History:  Procedure Laterality Date     SECTION, CLASSIC       Section    HYSTERECTOMY  2016    Hysterectomy-partail    OTHER SURGICAL HISTORY  2013    Complex Repair Of Wound Nose    REFRACTIVE SURGERY  2016    Corneal LASIK   [3]   Family History  Problem Relation Name Age of Onset    Breast cancer Maternal Grandmother Antonio 25    Cancer Maternal Grandmother Antonio     Hypertension Father Dnoald     Alcohol abuse Maternal Grandfather Sonia     Hearing loss Maternal Grandfather Sonia     Heart disease Mother Hafsa Delgadillolaura 70 - 79   [4] No Known Allergies

## 2025-07-11 NOTE — H&P (VIEW-ONLY)
CPM/PAT Evaluation       Name: Rosalva Correa (Rosalva Correa)  /Age: 1970/55 y.o.     Visit Type:   In-Person       Chief Complaint: Avulsion fracture of middle flank of finger    HPI 54 y/o female scheduled for ORIF of the hand on 2025 with  Dr. Ovalle secondary to Avulsion fracture of middle flank of finger.  PMHX includes HTN, anxiety .  PAT is consulted today for perioperative risk stratification and optimization.     Medical History[1]    Surgical History[2]    Patient  reports being sexually active and has had partner(s) who are male. She reports using the following method of birth control/protection: None.    Family History[3]    Allergies[4]    Prior to Admission medications    Medication Sig Start Date End Date Taking? Authorizing Provider   estradiol (Estrace) 1 mg tablet Take 1 tablet (1 mg) by mouth once daily. 3/10/25 3/10/26  Gonsalo Bartholomew MD   hydrOXYzine HCL (Atarax) 25 mg tablet TAKE 1 TABLET BY MOUTH 2 HOURS BEFORE BEDTIME AS NEEDED FOR ITCHING 23   Historical Provider, MD   losartan (Cozaar) 50 mg tablet Take 1 tablet (50 mg) by mouth once daily. 25   Historical Provider, MD   sertraline (Zoloft) 50 mg tablet Take 1 tablet (50 mg) by mouth once daily. 24   Historical Provider, MD   traMADol (Ultram) 50 mg tablet Take 1 tablet (50 mg) by mouth every 6 hours if needed for severe pain (7 - 10) for up to 3 days. 25  Ag Jo PA-C   valACYclovir (Valtrex) 500 mg tablet TAKE 1 TABLET BY MOUTH EVERY DAY 25   Gonsalo Bartholomew MD        [facility] ROS:   Constitutional:   neg    Neuro/Psych:   neg    Eyes:   neg    Ears:   neg    Nose:   Mouth:   Throat:   Neck:   Cardio:   neg    Respiratory:   neg    Endocrine:   GI:   neg    :   neg    Musculoskeletal:   neg    Hematologic:   Skin:      Physical Exam  Constitutional:       Appearance: Normal appearance.   HENT:      Head: Normocephalic and atraumatic.      Mouth/Throat:      Mouth: Mucous membranes are  "moist.   Eyes:      Extraocular Movements: Extraocular movements intact.   Cardiovascular:      Rate and Rhythm: Normal rate and regular rhythm.   Pulmonary:      Effort: Pulmonary effort is normal.      Breath sounds: Normal breath sounds.   Musculoskeletal:         General: Normal range of motion.      Cervical back: Normal range of motion.   Skin:     General: Skin is warm and dry.   Neurological:      General: No focal deficit present.      Mental Status: She is alert and oriented to person, place, and time.   Psychiatric:         Mood and Affect: Mood normal.         Behavior: Behavior normal.          Airway    Testing/Diagnostic:     Patient Specialist/PCP:     Visit Vitals  BP (!) 181/80 Comment: 181/92  pt axious at doctor's office   Pulse 87   Temp 36.7 °C (98.1 °F) (Temporal)   Resp 16   Ht 1.575 m (5' 2\")   Wt 48.6 kg (107 lb 2.3 oz)   SpO2 97%   BMI 19.60 kg/m²   OB Status Hysterectomy   Smoking Status Never   BSA 1.46 m²       DASI Risk Score      Flowsheet Row Pre-Admission Testing from 7/11/2025 in [facility]  Most recent reading at 7/11/2025  2:04 PM Questionnaire Series Submission from 7/11/2025 in [facility] OR with Generic Provider Thai  Most recent reading at 7/11/2025 12:38 PM   Can you take care of yourself (eat, dress, bathe, or use toilet)?  2.75 filed at 07/11/2025 1404 2.75  filed at 07/11/2025 1238   Can you walk indoors, such as around your house? 1.75 filed at 07/11/2025 1404 1.75  filed at 07/11/2025 1238   Can you walk a block or two on level ground?  2.75 filed at 07/11/2025 1404 2.75  filed at 07/11/2025 1238   Can you climb a flight of stairs or walk up a hill? 5.5 filed at 07/11/2025 1404 5.5  filed at 07/11/2025 1238   Can you run a short distance? 8 filed at 07/11/2025 1404 8  filed at 07/11/2025 1238   Can you do light work around the house like dusting or washing dishes? 2.7 filed at 07/11/2025 1404 2.7  filed at 07/11/2025 1238   Can you do " moderate work around the house like vacuuming, sweeping floors or carrying groceries? 3.5 filed at 07/11/2025 1404 3.5  filed at 07/11/2025 1238   Can you do heavy work around the house like scrubbing floors or lifting and moving heavy furniture?  8 filed at 07/11/2025 1404 8  filed at 07/11/2025 1238   Can you do yard work like raking leaves, weeding or pushing a mower? 4.5 filed at 07/11/2025 1404 4.5  filed at 07/11/2025 1238   Can you have sexual relations? 5.25 filed at 07/11/2025 1404 5.25  filed at 07/11/2025 1238   Can you participate in moderate recreational activities like golf, bowling, dancing, doubles tennis or throwing a baseball or football? 6 filed at 07/11/2025 1404 6  filed at 07/11/2025 1238   Can you participate in strenous sports like swimming, singles tennis, football, basketball, or skiing? 7.5 filed at 07/11/2025 1404 7.5  filed at 07/11/2025 1238   DASI SCORE 58.2 filed at 07/11/2025 1404 58.2  filed at 07/11/2025 1238   METS Score (Will be calculated only when all the questions are answered) 9.9 filed at 07/11/2025 1404 9.9  filed at 07/11/2025 1238          Capshani DVT Assessment      Flowsheet Row Pre-Admission Testing from 7/11/2025 in [facility]   DVT Score (IF A SCORE IS NOT CALCULATING, MUST SELECT A BMI TO COMPLETE) 3 filed at 07/11/2025 1424   Surgical Factors Minor surgery planned filed at 07/11/2025 1424   BMI (BMI MUST BE CHOSEN) 30 or less filed at 07/11/2025 1424          Modified Frailty Index    No data to display       [id number] Stroke Risk Points  Current as of 4 minutes ago        N/A 0 to 9 Points:      Last Change: N/A          The [id number] risk score (Lip SHERRY, et al. 2009. © 2010 [employer]) quantifies the risk of stroke for a patient with atrial fibrillation. For patients without atrial fibrillation or under the age of 18 this score appears as N/A. Higher score values generally indicate higher risk of stroke.        This  score is not applicable to this patient. Components are not calculated.          Revised Cardiac Risk Index      Flowsheet Row Pre-Admission Testing from 7/11/2025 in [facility]   High-Risk Surgery (Intraperitoneal, Intrathoracic,Suprainguinal vascular) 0 filed at 07/11/2025 1434   History of ischemic heart disease (History of MI, History of positive exercuse test, Current chest paint considered due to myocardial ischemia, Use of nitrate therapy, ECG with pathological Q Waves) 0 filed at 07/11/2025 1434   History of congestive heart failure (pulmonary edemia, bilateral rales or S3 gallop, Paroxysmal nocturnal dyspnea, CXR showing pulmonary vascular redistribution) 0 filed at 07/11/2025 1434   History of cerebrovascular disease (Prior TIA or stroke) 0 filed at 07/11/2025 1434   Pre-operative insulin treatment 0 filed at 07/11/2025 1434   Pre-operative creatinine>2 mg/dl 0 filed at 07/11/2025 1434   Revised Cardiac Risk Calculator 0 filed at 07/11/2025 1434          Apfel Simplified Score      Flowsheet Row Pre-Admission Testing from 7/11/2025 in [facility]   Smoking status 1 filed at 07/11/2025 1435   History of motion sickness or PONV  0 filed at 07/11/2025 1435   Use of postoperative opioids 1 filed at 07/11/2025 1435   Gender - Female 1=Yes filed at 07/11/2025 1435   Apfel Simplified Score Calculator 3 filed at 07/11/2025 1435          Risk Analysis Index Results This Encounter    No data found in the last 10 encounters.       Stop Bang Score      Flowsheet Row Pre-Admission Testing from 7/11/2025 in [facility]  Most recent reading at 7/11/2025  2:04 PM Questionnaire Series Submission from 7/11/2025 in [facility] OR with Generic Provider Thai  Most recent reading at 7/11/2025 12:38 PM   Do you snore loudly? 0 filed at 07/11/2025 1404 0  filed at 07/11/2025 1238   Do you often feel tired or fatigued after your sleep? 0 filed at 07/11/2025 1404 0  filed at  07/11/2025 1238   Has anyone ever observed you stop breathing in your sleep? 0 filed at 07/11/2025 1404 0  filed at 07/11/2025 1238   Do you have or are you being treated for high blood pressure? 1 filed at 07/11/2025 1404 1  filed at 07/11/2025 1238   Recent BMI (Calculated) 19.1 filed at 07/11/2025 1404 19.1  filed at 07/11/2025 1238   Is BMI greater than 35 kg/m2? 0=No filed at 07/11/2025 1404 0=No  filed at 07/11/2025 1238   Age older than 50 years old? 1=Yes filed at 07/11/2025 1404 1=Yes  filed at 07/11/2025 1238   Is your neck circumference greater than 17 inches (Male) or 16 inches (Female)? 0 filed at 07/11/2025 1404 --   Gender - Male 0=No filed at 07/11/2025 1404 0=No  filed at 07/11/2025 1238   STOP-BANG Total Score 2 filed at 07/11/2025 1404 --          Prodigy: High Risk  Total Score: 0          ARISCAT Score for Postoperative Pulmonary Complications      Flowsheet Row Pre-Admission Testing from 7/11/2025 in [facility]   Age Calculated Score 3 filed at 07/11/2025 1435   Preoperative SpO2 0 filed at 07/11/2025 1435   Respiratory infection in the last month Either upper or lower (i.e., URI, bronchitis, pneumonia), with fever and antibiotic treatment 0 filed at 07/11/2025 1435   Preoperative anemia (Hgb less than 10 g/dl) 0 filed at 07/11/2025 1435   Surgical incision  0 filed at 07/11/2025 1435   Duration of surgery  0 filed at 07/11/2025 1435   Emergency Procedure  0 filed at 07/11/2025 1435   ARISCAT Total Score  3 filed at 07/11/2025 1435          [place] Perioperative Risk for Myocardial Infarction or Cardiac Arrest (ANTONIO)      Flowsheet Row Pre-Admission Testing from 7/11/2025 in [facility]   Calculated Age Score 1.12 filed at 07/11/2025 1435   Functional Status  0 filed at 07/11/2025 1435   ASA Class  -3.29 filed at 07/11/2025 1435   Creatinine 0 filed at 07/11/2025 1435   Type of Procedure  0.80 filed at 07/11/2025 1435   ANTONIO Total Score  -6.62 filed at 07/11/2025 1431    ANTONIO % 0.13 filed at 07/11/2025 1435            Assessment and Plan:       HPI 56 y/o female scheduled for ORIF of the hand on 7/16/2025 with  Dr. Ovalle secondary to Avulsion fracture of middle flank of finger.  PMHX includes HTN, anxiety .  PAT is consulted today for perioperative risk stratification and optimization.         Neuro:  No neurologic diagnosis or significant findings on chart review, clinical presentation and evaluation.  No grossly apparent neurologic perioperative risk.    Patient is not at increased risk for perioperative CVA    Anxiety  Continue Atarax and Zoloft    HEENT:  No HEENT diagnosis or significant findings on chart review or clinical presentation and evaluation. No further preoperative testing/intervention indicated at this time.    Cardiovascular:  Follows with Dr. Ellis and was seen 1/2025 for follow up   ECHO 1/2025: EF 70%; mild LVH, DD  NST 1/2025:  negative for ischemia  BP elevated during PAT - per Pt - normally runs 120's when checked at home   Losartan day of procedure  METS: 9.9  RCRI: 0 points, 3.9%  risk for postoperative MACE       Pulmonary:  No pulmonary diagnosis or significant findings on chart review or clinical presentation.  No further preoperative testing is indicated at this time.    Stop Bang score is 2 placing patient at low risk for SPRING  PRODIGY: Low risk for opioid induced respiratory depression  Pumonary education discussed, patient also provided deep breathing exerciseswith educational handout    Renal:   No renal diagnosis, however patient is at increase risk for perioperative renal complications secondary to  Age equal to or greater than 56, HTN, use of an ace, arb, or NSAID      Endocrine:  No endocrine diagnosis or significant findings on chart review or clinical presentation and evaluation. No further testing or intervention is indicated at this time.    Hematologic:  No hematologic diagnosis, however patient is at an increased risk for DVT  Caprini  Score 3, patient at Moderate risk for perioperative DVT.  Patient provided with VTE education/handout.    Gastrointestinal:   No GI diagnosis or significant findings on chart review or clinical presentation and evaluation.   Apfel 3    Orthopaedic Surgery  Seen by Nohemi AMBRIZ on 7/8/2025 for avulsion fracture of middle finger  Plan for ORIF of the hand     Infectious disease:   No infectious diagnosis or significant findings on chart review or clinical presentation and evaluation.       Musculoskeletal:   No diagnosis or significant findings on chart review or clinical presentation and evaluation.     Anesthesia/Airway:  No anesthesia complications      Medication instructions and NPO guidelines reviewed with the patient.  All questions or concerns discussed and addressed.      Labs ordered                      [1]   Past Medical History:  Diagnosis Date    Acute gastroenteritis 08/11/2017    Acute recurrent pansinusitis 03/19/2018    Anxiety     Avulsion fracture of middle phalanx of finger, closed, initial encounter 07/05/2025    Dislocation of proximal interphalangeal joint of finger, initial encounter 07/05/2025    Dysfunction of both eustachian tubes 08/04/2014    Elevated blood-pressure reading, without diagnosis of hypertension 03/06/2019    Blood pressure elevated without history of HTN    Elevated hemoglobin A1c 01/08/2018    Encounter for gynecological examination (general) (routine) without abnormal findings     Encounter for routine gynecological examination    Encounter for other preprocedural examination 07/26/2016    Preoperative testing    Herpes     History of abdominal pain 08/31/2015    History of acute sinusitis 12/31/2015    History of constipation 08/31/2015    History of screening mammography 12/16/2016    History of senile atrophic vaginitis 12/30/2015    History of sore throat     History of syncope 03/05/2018    Impacted cerumen, unspecified ear 08/04/2014    Wax in ear    LVH (left  ventricular hypertrophy)     Migraine     Other conditions influencing health status     History Of ___ Previous Pregnancies    Pelvic pain 2016    Primary hypertension     Rash and other nonspecific skin eruption 2014    Rash    Right lower quadrant pain 09/15/2015    RLQ abdominal pain    Urinary tract infection, site not specified 2017    Acute UTI (urinary tract infection)    Vesicular rash 2015    Vitamin D deficiency    [2]   Past Surgical History:  Procedure Laterality Date     SECTION, CLASSIC       Section    HYSTERECTOMY  2016    Hysterectomy-partail    OTHER SURGICAL HISTORY  2013    Complex Repair Of Wound Nose    REFRACTIVE SURGERY  2016    Corneal LASIK   [3]   Family History  Problem Relation Name Age of Onset    Breast cancer Maternal Grandmother Antonio 25    Cancer Maternal Grandmother Antonio     Hypertension Father Donald     Alcohol abuse Maternal Grandfather Sonia     Hearing loss Maternal Grandfather Sonia     Heart disease Mother Hafsa Delgadillolaura 70 - 79   [4] No Known Allergies

## 2025-07-14 NOTE — TELEPHONE ENCOUNTER
I called patient and let her know that this depends on how often she is typing/ if she is able to use her non injured hand to type. I advised that she will be put into a splint in recovery post surgery and may have some of her fingers to type if needed. The standard recovery time would be 4-6 weeks and I suggested taking the time off for her recovery. She will be sending over paperwork today.

## 2025-07-15 NOTE — DISCHARGE INSTRUCTIONS
Dr. Ovalle Post-Operative Instructions  Hand/Wrist/Elbow    Activity:  Rest on the day of surgery.  Gradually resume your regular diet, beginning with clear liquids and progressing as you feel ready.  No driving if you had anesthesia.    Anesthesia:  You may feel dizzy, sleepy or lightheaded for up to 24 hours after surgery.  If you had general anesthesia you may have a sore throat for 1-2 days.  If you had a nerve block wear a sling until nerve function returns for your safety.  Nerve block may last 18-36 hours.    Post-Operative Medications:  You may resume your regular medications (including blood thinners if you stopped them).  You have been given a prescription for pain medication as needed.  You may also take over-the-counter anti-inflammatories and/or Tylenol for pain relief.  If you are taking the prescribed pain medication you must limit additional Tylenol (acetaminophen) intake to avoid overdose.    Dressings:  Keep your splint clean and dry.  You may cover with a plastic bag or cast cover and seal bag to your skin above the bandage for showering.  If your dressing becomes wet or significantly bloodstained call the office at [phone].    Post-Operative Care:  Keep the surgical site elevated above the level of your heart to limit swelling.  If your fingers are not included in the dressing you are encouraged to move your fingers regularly (full fist and full extension).  Regularly ice the surgical site.  You may also apply ice pack above the level of the dressing and this will cool the blood as it travels towards the surgical site.    Call Surgeon/Office at any time for:           Office Number: [phone]  Excessive bleeding  Loss of feeling (The local numbing medicine from surgery typically lasts 8-12 hours.  Nerve blocks can last 18-36 hours.)  Tight dressing: Make sure that you are elevating the operative site appropriately.  If no relief then call the office.                                                                                                         Circulation issues:  If fingers change to white or blue  Concerns/Problems with your surgery

## 2025-07-16 ENCOUNTER — APPOINTMENT (OUTPATIENT)
Dept: RADIOLOGY | Facility: HOSPITAL | Age: 55
End: 2025-07-16
Payer: COMMERCIAL

## 2025-07-16 ENCOUNTER — PHARMACY VISIT (OUTPATIENT)
Dept: PHARMACY | Facility: CLINIC | Age: 55
End: 2025-07-16
Payer: MEDICARE

## 2025-07-16 ENCOUNTER — HOSPITAL ENCOUNTER (OUTPATIENT)
Facility: HOSPITAL | Age: 55
Setting detail: OUTPATIENT SURGERY
Discharge: HOME | End: 2025-07-16
Attending: ORTHOPAEDIC SURGERY | Admitting: ORTHOPAEDIC SURGERY
Payer: COMMERCIAL

## 2025-07-16 ENCOUNTER — ANESTHESIA (OUTPATIENT)
Dept: OPERATING ROOM | Facility: HOSPITAL | Age: 55
End: 2025-07-16
Payer: COMMERCIAL

## 2025-07-16 VITALS
TEMPERATURE: 96.8 F | WEIGHT: 104.17 LBS | HEART RATE: 81 BPM | HEIGHT: 62 IN | DIASTOLIC BLOOD PRESSURE: 65 MMHG | RESPIRATION RATE: 20 BRPM | OXYGEN SATURATION: 98 % | SYSTOLIC BLOOD PRESSURE: 147 MMHG | BODY MASS INDEX: 19.17 KG/M2

## 2025-07-16 DIAGNOSIS — S62.629A AVULSION FRACTURE OF MIDDLE PHALANX OF FINGER, CLOSED, INITIAL ENCOUNTER: Primary | ICD-10-CM

## 2025-07-16 DIAGNOSIS — S63.289A DISLOCATION OF PROXIMAL INTERPHALANGEAL JOINT OF FINGER, INITIAL ENCOUNTER: ICD-10-CM

## 2025-07-16 PROCEDURE — 2500000004 HC RX 250 GENERAL PHARMACY W/ HCPCS (ALT 636 FOR OP/ED): Performed by: ANESTHESIOLOGY

## 2025-07-16 PROCEDURE — 3600000008 HC OR TIME - EACH INCREMENTAL 1 MINUTE - PROCEDURE LEVEL THREE: Performed by: ORTHOPAEDIC SURGERY

## 2025-07-16 PROCEDURE — 2500000004 HC RX 250 GENERAL PHARMACY W/ HCPCS (ALT 636 FOR OP/ED): Performed by: NURSE ANESTHETIST, CERTIFIED REGISTERED

## 2025-07-16 PROCEDURE — RXMED WILLOW AMBULATORY MEDICATION CHARGE

## 2025-07-16 PROCEDURE — 3700000001 HC GENERAL ANESTHESIA TIME - INITIAL BASE CHARGE: Performed by: ORTHOPAEDIC SURGERY

## 2025-07-16 PROCEDURE — 3600000003 HC OR TIME - INITIAL BASE CHARGE - PROCEDURE LEVEL THREE: Performed by: ORTHOPAEDIC SURGERY

## 2025-07-16 PROCEDURE — 76000 FLUOROSCOPY <1 HR PHYS/QHP: CPT

## 2025-07-16 PROCEDURE — 7100000001 HC RECOVERY ROOM TIME - INITIAL BASE CHARGE: Performed by: ORTHOPAEDIC SURGERY

## 2025-07-16 PROCEDURE — 26776 PIN FINGER DISLOCATION: CPT | Performed by: ORTHOPAEDIC SURGERY

## 2025-07-16 PROCEDURE — A26776: Performed by: NURSE ANESTHETIST, CERTIFIED REGISTERED

## 2025-07-16 PROCEDURE — 7100000010 HC PHASE TWO TIME - EACH INCREMENTAL 1 MINUTE: Performed by: ORTHOPAEDIC SURGERY

## 2025-07-16 PROCEDURE — 2500000005 HC RX 250 GENERAL PHARMACY W/O HCPCS

## 2025-07-16 PROCEDURE — 3700000002 HC GENERAL ANESTHESIA TIME - EACH INCREMENTAL 1 MINUTE: Performed by: ORTHOPAEDIC SURGERY

## 2025-07-16 PROCEDURE — 7100000002 HC RECOVERY ROOM TIME - EACH INCREMENTAL 1 MINUTE: Performed by: ORTHOPAEDIC SURGERY

## 2025-07-16 PROCEDURE — 2500000004 HC RX 250 GENERAL PHARMACY W/ HCPCS (ALT 636 FOR OP/ED): Performed by: ORTHOPAEDIC SURGERY

## 2025-07-16 PROCEDURE — A26776: Performed by: ANESTHESIOLOGY

## 2025-07-16 PROCEDURE — 7100000009 HC PHASE TWO TIME - INITIAL BASE CHARGE: Performed by: ORTHOPAEDIC SURGERY

## 2025-07-16 PROCEDURE — 2500000005 HC RX 250 GENERAL PHARMACY W/O HCPCS: Performed by: ORTHOPAEDIC SURGERY

## 2025-07-16 DEVICE — WIRE, KIRSCHNER, DUAL TROCAR, 0.045 X 4 IN, SS, NS: Type: IMPLANTABLE DEVICE | Site: INDEX FINGER | Status: FUNCTIONAL

## 2025-07-16 RX ORDER — DROPERIDOL 2.5 MG/ML
0.62 INJECTION, SOLUTION INTRAMUSCULAR; INTRAVENOUS ONCE AS NEEDED
Status: DISCONTINUED | OUTPATIENT
Start: 2025-07-16 | End: 2025-07-16 | Stop reason: HOSPADM

## 2025-07-16 RX ORDER — MEPERIDINE HYDROCHLORIDE 25 MG/ML
12.5 INJECTION INTRAMUSCULAR; INTRAVENOUS; SUBCUTANEOUS EVERY 10 MIN PRN
Status: DISCONTINUED | OUTPATIENT
Start: 2025-07-16 | End: 2025-07-16 | Stop reason: HOSPADM

## 2025-07-16 RX ORDER — OXYCODONE HYDROCHLORIDE 5 MG/1
5 TABLET ORAL EVERY 6 HOURS PRN
Qty: 6 TABLET | Refills: 0 | Status: SHIPPED | OUTPATIENT
Start: 2025-07-16

## 2025-07-16 RX ORDER — ALBUTEROL SULFATE 0.83 MG/ML
2.5 SOLUTION RESPIRATORY (INHALATION) ONCE AS NEEDED
Status: DISCONTINUED | OUTPATIENT
Start: 2025-07-16 | End: 2025-07-16 | Stop reason: HOSPADM

## 2025-07-16 RX ORDER — ONDANSETRON HYDROCHLORIDE 2 MG/ML
4 INJECTION, SOLUTION INTRAVENOUS ONCE AS NEEDED
Status: DISCONTINUED | OUTPATIENT
Start: 2025-07-16 | End: 2025-07-16 | Stop reason: HOSPADM

## 2025-07-16 RX ORDER — OXYCODONE HYDROCHLORIDE 5 MG/1
5 TABLET ORAL EVERY 4 HOURS PRN
Status: DISCONTINUED | OUTPATIENT
Start: 2025-07-16 | End: 2025-07-16 | Stop reason: HOSPADM

## 2025-07-16 RX ORDER — LIDOCAINE HCL/PF 100 MG/5ML
SYRINGE (ML) INTRAVENOUS AS NEEDED
Status: DISCONTINUED | OUTPATIENT
Start: 2025-07-16 | End: 2025-07-16

## 2025-07-16 RX ORDER — CHLORHEXIDINE GLUCONATE 40 MG/ML
SOLUTION TOPICAL AS NEEDED
Status: DISCONTINUED | OUTPATIENT
Start: 2025-07-16 | End: 2025-07-16 | Stop reason: HOSPADM

## 2025-07-16 RX ORDER — FENTANYL CITRATE 50 UG/ML
INJECTION, SOLUTION INTRAMUSCULAR; INTRAVENOUS AS NEEDED
Status: DISCONTINUED | OUTPATIENT
Start: 2025-07-16 | End: 2025-07-16

## 2025-07-16 RX ORDER — MIDAZOLAM HYDROCHLORIDE 1 MG/ML
INJECTION, SOLUTION INTRAMUSCULAR; INTRAVENOUS AS NEEDED
Status: DISCONTINUED | OUTPATIENT
Start: 2025-07-16 | End: 2025-07-16

## 2025-07-16 RX ORDER — PROPOFOL 10 MG/ML
INJECTION, EMULSION INTRAVENOUS AS NEEDED
Status: DISCONTINUED | OUTPATIENT
Start: 2025-07-16 | End: 2025-07-16

## 2025-07-16 RX ORDER — BUPIVACAINE HYDROCHLORIDE 5 MG/ML
INJECTION, SOLUTION PERINEURAL AS NEEDED
Status: DISCONTINUED | OUTPATIENT
Start: 2025-07-16 | End: 2025-07-16 | Stop reason: HOSPADM

## 2025-07-16 RX ORDER — SODIUM CHLORIDE, SODIUM LACTATE, POTASSIUM CHLORIDE, CALCIUM CHLORIDE 600; 310; 30; 20 MG/100ML; MG/100ML; MG/100ML; MG/100ML
20 INJECTION, SOLUTION INTRAVENOUS CONTINUOUS
Status: DISCONTINUED | OUTPATIENT
Start: 2025-07-16 | End: 2025-07-16 | Stop reason: HOSPADM

## 2025-07-16 RX ORDER — DIPHENHYDRAMINE HYDROCHLORIDE 50 MG/ML
12.5 INJECTION, SOLUTION INTRAMUSCULAR; INTRAVENOUS ONCE AS NEEDED
Status: DISCONTINUED | OUTPATIENT
Start: 2025-07-16 | End: 2025-07-16 | Stop reason: HOSPADM

## 2025-07-16 RX ORDER — SODIUM CHLORIDE 0.9 G/100ML
INJECTION, SOLUTION IRRIGATION AS NEEDED
Status: DISCONTINUED | OUTPATIENT
Start: 2025-07-16 | End: 2025-07-16 | Stop reason: HOSPADM

## 2025-07-16 RX ORDER — ONDANSETRON HYDROCHLORIDE 2 MG/ML
INJECTION, SOLUTION INTRAVENOUS AS NEEDED
Status: DISCONTINUED | OUTPATIENT
Start: 2025-07-16 | End: 2025-07-16

## 2025-07-16 RX ORDER — SODIUM CHLORIDE, SODIUM LACTATE, POTASSIUM CHLORIDE, CALCIUM CHLORIDE 600; 310; 30; 20 MG/100ML; MG/100ML; MG/100ML; MG/100ML
100 INJECTION, SOLUTION INTRAVENOUS CONTINUOUS
Status: DISCONTINUED | OUTPATIENT
Start: 2025-07-16 | End: 2025-07-16 | Stop reason: HOSPADM

## 2025-07-16 RX ORDER — CEFAZOLIN 1 G/1
INJECTION, POWDER, FOR SOLUTION INTRAVENOUS AS NEEDED
Status: DISCONTINUED | OUTPATIENT
Start: 2025-07-16 | End: 2025-07-16

## 2025-07-16 RX ADMIN — FENTANYL CITRATE 50 MCG: 50 INJECTION, SOLUTION INTRAMUSCULAR; INTRAVENOUS at 15:11

## 2025-07-16 RX ADMIN — POVIDONE-IODINE 1 APPLICATION: 5 SOLUTION TOPICAL at 12:42

## 2025-07-16 RX ADMIN — LIDOCAINE HYDROCHLORIDE 50 MG: 20 INJECTION INTRAVENOUS at 15:11

## 2025-07-16 RX ADMIN — CEFAZOLIN 2 G: 1 INJECTION, POWDER, FOR SOLUTION INTRAMUSCULAR; INTRAVENOUS at 15:14

## 2025-07-16 RX ADMIN — DEXAMETHASONE SODIUM PHOSPHATE 4 MG: 4 INJECTION INTRA-ARTICULAR; INTRALESIONAL; INTRAMUSCULAR; INTRAVENOUS; SOFT TISSUE at 15:16

## 2025-07-16 RX ADMIN — MIDAZOLAM 2 MG: 1 INJECTION INTRAMUSCULAR; INTRAVENOUS at 15:10

## 2025-07-16 RX ADMIN — SODIUM CHLORIDE, SODIUM LACTATE, POTASSIUM CHLORIDE, AND CALCIUM CHLORIDE 20 ML/HR: .6; .31; .03; .02 INJECTION, SOLUTION INTRAVENOUS at 12:41

## 2025-07-16 RX ADMIN — ONDANSETRON HYDROCHLORIDE 4 MG: 2 SOLUTION INTRAMUSCULAR; INTRAVENOUS at 15:15

## 2025-07-16 RX ADMIN — PROPOFOL 200 MG: 10 INJECTION, EMULSION INTRAVENOUS at 15:11

## 2025-07-16 RX ADMIN — FENTANYL CITRATE 50 MCG: 50 INJECTION, SOLUTION INTRAMUSCULAR; INTRAVENOUS at 15:10

## 2025-07-16 SDOH — HEALTH STABILITY: MENTAL HEALTH: CURRENT SMOKER: 0

## 2025-07-16 ASSESSMENT — PAIN SCALES - GENERAL
PAINLEVEL_OUTOF10: 0 - NO PAIN

## 2025-07-16 ASSESSMENT — PAIN - FUNCTIONAL ASSESSMENT: PAIN_FUNCTIONAL_ASSESSMENT: 0-10

## 2025-07-16 ASSESSMENT — COLUMBIA-SUICIDE SEVERITY RATING SCALE - C-SSRS
1. IN THE PAST MONTH, HAVE YOU WISHED YOU WERE DEAD OR WISHED YOU COULD GO TO SLEEP AND NOT WAKE UP?: NO
2. HAVE YOU ACTUALLY HAD ANY THOUGHTS OF KILLING YOURSELF?: NO
6. HAVE YOU EVER DONE ANYTHING, STARTED TO DO ANYTHING, OR PREPARED TO DO ANYTHING TO END YOUR LIFE?: NO

## 2025-07-16 NOTE — ANESTHESIA PREPROCEDURE EVALUATION
Patient: Rosalva Correa    Procedure Information       Date/Time: 07/16/25 1350    Procedure: ORIF, HAND (Right: Index Finger)    Location: [facility] / [facility]    Surgeons: Rashaun Ovalle MD          Vitals:    07/16/25 1226   BP: 153/75   Pulse: 81   Resp: 16   Temp: 37.6 °C (99.7 °F)   SpO2: 99%       Surgical History[1]  Medical History[2]  Current Medications[3]  Prior to Admission medications   Medication Sig Start Date End Date Taking? Authorizing Provider   ascorbic acid (Vitamin C) 250 mg tablet Take 1 tablet (250 mg) by mouth once daily.   Yes Historical Provider, MD   cholecalciferol (Vitamin D3) 50 mcg (2,000 units) tablet Take 1 tablet (50 mcg) by mouth once daily.   Yes Historical Provider, MD   estradiol (Estrace) 1 mg tablet Take 1 tablet (1 mg) by mouth once daily. 3/10/25 3/10/26 Yes Gonsalo Bartholomew MD   hydrOXYzine HCL (Atarax) 25 mg tablet TAKE 1 TABLET BY MOUTH 2 HOURS BEFORE BEDTIME AS NEEDED FOR ITCHING 8/28/23  Yes Historical Provider, MD   losartan (Cozaar) 50 mg tablet Take 1 tablet (50 mg) by mouth 2 times a day. Lunch and evening 1/30/25  Yes Historical Provider, MD   magnesium 250 mg tablet Take by mouth.   Yes Historical Provider, MD   multivit-min/iron fum/folic ac (ONE-A-DAY WOMEN'S COMPLETE ORAL) Take by mouth.   Yes Historical Provider, MD   mv-min-C-glutamin-lysine-hb124 (Airborne, lysine HCl,) 1,000-50 mg tablet, effervescent Take by mouth.   Yes Historical Provider, MD   sertraline (Zoloft) 50 mg tablet Take 1 tablet (50 mg) by mouth once daily. 12/26/24  Yes Historical Provider, MD   valACYclovir (Valtrex) 500 mg tablet TAKE 1 TABLET BY MOUTH EVERY DAY 6/6/25  Yes Gonsalo Bartholomew MD     RX Allergies[4]  Social History     Tobacco Use    Smoking status: Never    Smokeless tobacco: Never   Substance Use Topics    Alcohol use: Yes     Alcohol/week: 1.0 standard drink of alcohol     Types: 1 Cans of beer per week     Comment: rare         Chemistry    Lab Results  "  Component Value Date/Time     (L) 2025 1439    K 4.1 2025 1439     2025 1439    CO2 24 2025 1439    BUN 15 2025 1439    CREATININE 0.74 2025 1439    Lab Results   Component Value Date/Time    CALCIUM 9.3 2025 1439    ALKPHOS 41 2020 0932    AST 22 2020 0932    ALT 13 2020 0932    BILITOT 0.5 2020 0932          Lab Results   Component Value Date/Time    WBC 8.8 2025 1439    HGB 13.3 2025 1439    HCT 39.3 2025 1439     2025 1439     No results found for: \"PROTIME\", \"PTT\", \"INR\"  No results found for this or any previous visit (from the past 4464 hours).  No results found for this or any previous visit from the past 1095 days.      Relevant Problems   No relevant active problems       Clinical information reviewed:   Tobacco  Allergies  Meds   Med Hx  Surg Hx   Fam Hx  Soc Hx        NPO Detail:  NPO/Void Status  Date of Last Liquid: 07/15/25  Time of Last Liquid:   Date of Last Solid: 07/15/25  Time of Last Solid:   Last Intake Type: Clear fluids  Time of Last Void: 1100         Physical Exam    Airway  Mallampati: II     Cardiovascular - normal exam   Dental    Pulmonary - normal exam   Abdominal            Anesthesia Plan    History of general anesthesia?: yes  History of complications of general anesthesia?: no    ASA 2     general     The patient is not a current smoker.  Patient was not previously instructed to abstain from smoking on day of procedure.  Patient did not smoke on day of procedure.  Education provided regarding risk of obstructive sleep apnea.  intravenous induction   Anesthetic plan and risks discussed with patient.    Plan discussed with CRNA.           [1]   Past Surgical History:  Procedure Laterality Date     SECTION, CLASSIC       Section    HYSTERECTOMY  2016    Hysterectomy-partail    OTHER SURGICAL HISTORY  2013    Complex Repair Of Wound " Nose    REFRACTIVE SURGERY  12/16/2016    Corneal LASIK   [2]   Past Medical History:  Diagnosis Date    Acute gastroenteritis 08/11/2017    Acute recurrent pansinusitis 03/19/2018    Anxiety     Avulsion fracture of middle phalanx of finger, closed, initial encounter 07/05/2025    Dislocation of proximal interphalangeal joint of finger, initial encounter 07/05/2025    Dysfunction of both eustachian tubes 08/04/2014    Elevated blood-pressure reading, without diagnosis of hypertension 03/06/2019    Blood pressure elevated without history of HTN    Elevated hemoglobin A1c 01/08/2018    Encounter for gynecological examination (general) (routine) without abnormal findings     Encounter for routine gynecological examination    Encounter for other preprocedural examination 07/26/2016    Preoperative testing    Herpes     History of abdominal pain 08/31/2015    History of acute sinusitis 12/31/2015    History of constipation 08/31/2015    History of screening mammography 12/16/2016    History of senile atrophic vaginitis 12/30/2015    History of sore throat     History of syncope 03/05/2018    Impacted cerumen, unspecified ear 08/04/2014    Wax in ear    LVH (left ventricular hypertrophy)     Migraine     Other conditions influencing health status     History Of ___ Previous Pregnancies    Pelvic pain 01/27/2016    Primary hypertension     Rash and other nonspecific skin eruption 12/29/2014    Rash    Right lower quadrant pain 09/15/2015    RLQ abdominal pain    Urinary tract infection, site not specified 08/11/2017    Acute UTI (urinary tract infection)    Vesicular rash 03/20/2015    Vitamin D deficiency    [3]   Current Facility-Administered Medications:     lactated Ringer's infusion, 20 mL/hr, intravenous, Continuous, Claudio Garcia MD, Last Rate: 20 mL/hr at 07/16/25 1241, 20 mL/hr at 07/16/25 1241  [4] No Known Allergies

## 2025-07-16 NOTE — OP NOTE
Pre-Operative Diagnosis: Right index finger PIP joint fracture/dislocation  Post-Operative Diagnosis: same  Procedure: Closed reduction and pinning of right index finger PIP joint fracture/dislocation  Surgeon: Vasquez  Assistant: Anabel  Anesthesia: General  Complications: none  Estimated blood loss: Minimal  Specimen: None  Implants:  Implant Name Type Inv. Item Serial No.  Lot No. LRB No. Used Action   WIRE, SHERRY, DUAL TROCAR, 0.045 X 4 IN, SS, NS - [id number] Screw WIRE, SHERRY, DUAL TROCAR, 0.045 X 4 IN, SS, NS  [employer]  Right 1 Implanted     Findings: See below  Disposition: Good/PACU      Indications: Patient sustained right index finger PIP fracture/dislocation with residual subluxation of the PIP joint.  We discussed risks and benefits of nonoperative versus operative treatment options and after thoroughly reviewing patient wished to proceed with surgical intervention, as indicated..  Expected operative and postoperative courses reviewed and questions addressed.    Operative course: Patient was greeted in the preoperative holding area and the operative site was marked with indelible marker.  Patient was brought back to the operating room suite where general anesthetic was induced by the anesthesia team.  A closed reduction was performed of the PIP joint by pulling gentle traction and translating the middle phalanx volarly.  After this was completed fluoroscopic imaging demonstrated good reduction of the intra-articular fragment of the middle phalanx.  While holding the finger in a reduced position the PIP joint was able to fully flex and extend to within 30 degrees of extension before dorsally subluxing.  Decision was made to proceed with dorsal blocking pin.  A 0.045 inch K wire was introduced in a retrograde fashion under fluoroscopic guidance in order to prevent the PIP joint from extending beyond 30 degrees of flexion.  Fluoroscopic imaging confirms concentric  reduction of the PIP joint as well as maintained good flexion.  The pin was bent and cut short.  Dry sterile dressings were applied and digital block was placed.  Patient was awoken from anesthesia uneventfully.  Tentative plan will be for returning to clinic in 5 days for transition into Thermoplast splint and beginning active range of motion.  Likely plan on pin removal at 4 weeks postoperatively.

## 2025-07-16 NOTE — ANESTHESIA POSTPROCEDURE EVALUATION
Patient: Rosalva Correa    Procedure Summary       Date: 07/16/25 Room / Location: [facility] OR 02 / [facility]    Anesthesia Start: 1508 Anesthesia Stop: 1542    Procedure: ORIF, HAND (Right: Index Finger) Diagnosis:       Avulsion fracture of middle phalanx of finger, closed, initial encounter      Dislocation of proximal interphalangeal joint of finger, initial encounter      (Avulsion fracture of middle phalanx of finger, closed, initial encounter [S68.659V])      (Dislocation of proximal interphalangeal joint of finger, initial encounter [S69.592Z])    Surgeons: Rashaun Ovalle MD Responsible Provider: Claudio Garcia MD    Anesthesia Type: general ASA Status: 2            Anesthesia Type: general    Vitals Value Taken Time   /70 07/16/25 16:09   Temp 36 °C (96.8 °F) 07/16/25 15:39   Pulse 76 07/16/25 16:09   Resp 20 07/16/25 16:09   SpO2 97 % 07/16/25 16:09       Anesthesia Post Evaluation    Patient location during evaluation: PACU  Patient participation: complete - patient participated  Level of consciousness: awake  Pain management: adequate  Multimodal analgesia pain management approach  Airway patency: patent  Two or more strategies used to mitigate risk of obstructive sleep apnea  Cardiovascular status: acceptable  Respiratory status: acceptable  Hydration status: acceptable  Postoperative Nausea and Vomiting: none        No notable events documented.

## 2025-07-16 NOTE — INTERVAL H&P NOTE
H&P reviewed. The patient was examined and there are no changes to the H&P.    Risks and benefits of nonoperative versus operative treatment reviewed and questions addressed.  Consent obtained.

## 2025-07-16 NOTE — ANESTHESIA PROCEDURE NOTES
Airway  Date/Time: 7/16/2025 3:12 PM  Reason: elective    Airway not difficult    Staffing  Performed: CRNA   Authorized by: Claudio Garcia MD    Performed by: BHAVIN Cohn  Patient location during procedure: OR    Patient Condition  Indications for airway management: anesthesia  Patient position: sniffing  Sedation level: deep     Final Airway Details   Preoxygenated: yes  Final airway type: supraglottic airway  Successful airway:   Size: 3  Number of attempts at approach: 1  Number of other approaches attempted: 0

## 2025-07-18 DIAGNOSIS — S63.289A DISLOCATION OF PROXIMAL INTERPHALANGEAL JOINT OF FINGER, INITIAL ENCOUNTER: ICD-10-CM

## 2025-07-18 DIAGNOSIS — S62.629A AVULSION FRACTURE OF MIDDLE PHALANX OF FINGER, CLOSED, INITIAL ENCOUNTER: ICD-10-CM

## 2025-07-21 ENCOUNTER — EVALUATION (OUTPATIENT)
Dept: OCCUPATIONAL THERAPY | Facility: HOSPITAL | Age: 55
End: 2025-07-21
Payer: COMMERCIAL

## 2025-07-21 DIAGNOSIS — M79.644 FINGER PAIN, RIGHT: Primary | ICD-10-CM

## 2025-07-21 PROCEDURE — 97760 ORTHOTIC MGMT&TRAING 1ST ENC: CPT | Mod: GO

## 2025-07-28 ENCOUNTER — APPOINTMENT (OUTPATIENT)
Dept: ORTHOPEDIC SURGERY | Facility: CLINIC | Age: 55
End: 2025-07-28
Payer: COMMERCIAL

## 2025-07-28 ENCOUNTER — HOSPITAL ENCOUNTER (OUTPATIENT)
Dept: RADIOLOGY | Facility: HOSPITAL | Age: 55
Discharge: HOME | End: 2025-07-28
Payer: COMMERCIAL

## 2025-07-28 ENCOUNTER — DOCUMENTATION (OUTPATIENT)
Dept: OCCUPATIONAL THERAPY | Facility: HOSPITAL | Age: 55
End: 2025-07-28

## 2025-07-28 DIAGNOSIS — S62.629A AVULSION FRACTURE OF MIDDLE PHALANX OF FINGER, CLOSED, INITIAL ENCOUNTER: ICD-10-CM

## 2025-07-28 DIAGNOSIS — S63.289A DISLOCATION OF PROXIMAL INTERPHALANGEAL JOINT OF FINGER, INITIAL ENCOUNTER: ICD-10-CM

## 2025-07-28 PROCEDURE — 73130 X-RAY EXAM OF HAND: CPT | Mod: RT

## 2025-07-28 PROCEDURE — 99024 POSTOP FOLLOW-UP VISIT: CPT | Performed by: ORTHOPAEDIC SURGERY

## 2025-07-28 PROCEDURE — 73130 X-RAY EXAM OF HAND: CPT | Mod: RIGHT SIDE | Performed by: RADIOLOGY

## 2025-07-28 ASSESSMENT — PAIN - FUNCTIONAL ASSESSMENT: PAIN_FUNCTIONAL_ASSESSMENT: 0-10

## 2025-07-28 ASSESSMENT — PAIN SCALES - GENERAL: PAINLEVEL_OUTOF10: 3

## 2025-07-28 NOTE — PROGRESS NOTES
Occupational Therapy                 Therapy Communication Note    Patient Name: Rosalva Correa  MRN: [id number]  Department:   Room: Room/bed info not found  Today's Date: 7/28/2025     Discipline: Occupational Therapy    Comment:    12:05-12:20 re-made splint to a bivalve secondary to dip joint pain and pin site getting irritated.    Recommend patient be re-evaluated in OT clinic for a full evaluation and treatment secondary to increased edema and increased stiffness and pain.    Ayleen Bey, OTR/L

## 2025-07-29 NOTE — PROGRESS NOTES
History of Present Illness  Chief Complaint   Patient presents with    Right Hand - Post-op     7/16/25 right hand ORIF     Pain controlled  Stiffness into PIP joint with minimal active/passive motion     Examination  Right index finger: Pin site is clean and dry without signs of infection.  PIP joint currently being held in about 60 degrees flexion with minimal active/passive extension/flexion.  Sensation grossly intact distally.  Capillary refill less than 2 seconds.     Right hand radiographs ordered and available for review demonstrate maintained PIP joint congruity.  Volar fracture fragment remains visible.    Assessment:  Patient status post right index finger PIP joint dorsal blocking pin for PIP joint fracture/dislocation     Plan  Reviewed with patient's as well as hand therapist that intraoperative motion was approximately 30 to 90 degrees flexion with maintained joint congruity.  Patient will begin more regular follow-up with hand therapy for assistance with PIP joint mobilization with pin in place.  We will hopefully be able to maintain pin for an additional 2 weeks to allow for continued healing of the fracture.  Reviewed with patient that if there are any pin issues she should call the office immediately.  If pin does need to be removed earlier would transition to a dorsal blocking splints at PIP joint.  Follow-up in 2 weeks for clinical check and likely pin removal.

## 2025-07-30 ENCOUNTER — APPOINTMENT (OUTPATIENT)
Dept: ORTHOPEDIC SURGERY | Facility: CLINIC | Age: 55
End: 2025-07-30
Payer: COMMERCIAL

## 2025-07-30 DIAGNOSIS — S63.289A: ICD-10-CM

## 2025-07-30 DIAGNOSIS — M79.644 FINGER PAIN, RIGHT: Primary | ICD-10-CM

## 2025-07-31 ENCOUNTER — TREATMENT (OUTPATIENT)
Dept: OCCUPATIONAL THERAPY | Facility: CLINIC | Age: 55
End: 2025-07-31
Payer: COMMERCIAL

## 2025-07-31 DIAGNOSIS — M79.644 FINGER PAIN, RIGHT: ICD-10-CM

## 2025-07-31 DIAGNOSIS — M25.641 FINGER STIFFNESS, RIGHT: Primary | ICD-10-CM

## 2025-07-31 PROCEDURE — 97165 OT EVAL LOW COMPLEX 30 MIN: CPT | Mod: GO

## 2025-07-31 PROCEDURE — 97110 THERAPEUTIC EXERCISES: CPT | Mod: GO

## 2025-07-31 ASSESSMENT — PAIN - FUNCTIONAL ASSESSMENT: PAIN_FUNCTIONAL_ASSESSMENT: 0-10

## 2025-07-31 ASSESSMENT — PAIN SCALES - GENERAL: PAINLEVEL_OUTOF10: 4

## 2025-07-31 NOTE — PROGRESS NOTES
Patient Name: Rosalva Correa  MRN: [id number]  Today's Date: 7/31/2025  Time Calculation  Start Time: 1201  Stop Time: 1245  Time Calculation (min): 44 min      Assessment:Pt demonstrates limited ROM and strength as result of finger fracture. This is resulting in limited participation in pain-free ADLs and inability to perform at the prior level of function. Patient will benefit from skilled Occupational Therapy services to address impairments noted in eval.        Plan: Plan to see the patient back in roughly two weeks following pin removal. Planned Interventions include: therapeutic exercise, therapeutic activity, self-care home management, manual therapy, therapeutic activities, neuromuscular coordination, electric stimulation, ultrasound, kinesiotaping,and IASTM.    Outpatient Plan  Frequency: 1-2  Duration: 10 weeks  Rehab Potential: Good  Plan of Care Agreement: Patient    Insurance:  Visit number: 1 of 1  Authorization info: No auth  Insurance Type: NO AUTH/ 800 D[employer]/ 80% COVERAGE/ 70V PT/OT    Subjective:   Chief Complaint: Pre-Operative Diagnosis: Right index finger PIP joint fracture/dislocation  Post-Operative Diagnosis: same  Procedure: Closed reduction and pinning of right index finger PIP joint fracture/dislocation. Pin placed to prevent PIP ext past 30 degrees. Pin removal at 4 weeks.  Onset: 7-5-25  KHURRAM:  Playing pickle ball when she accidentally injured her right index finger   DOI/DOS: 7-16-25    Current Problem  1. Finger stiffness, right  Follow Up In Occupational Therapy      2. Finger pain, right  Follow Up In Occupational Therapy    Follow Up In Occupational Therapy        General:  General  Reason for Referral: R index dislocation/fracture  Referred By: Dr. Ovalle  General Comment: Onset 7-16-25, visit 2     Precautions: No PIP extension past 30 degrees.   Precautions  Precautions Comment: No extension due to pin and post op restrictions    Medical History Form: Reviewed (scanned into  chart)    Prior Functional Level    Prior Function Per Pt/Caregiver Report  Vocational: Full time employment ()  Leisure: pickleball, yoga, walking and bike riding.  Hand Dominance: Right     Current Functional level    ADL  ADL Comments: Pt reports compensating using her left hand and other digits.    Home Set-Up  Home Living  Type of Home: House  Lives With:  (family)  Home Access: No concerns      PAIN  Pain Assessment  Pain Assessment: 0-10  0-10 (Numeric) Pain Score: 4  Pain Type: Surgical pain, Chronic pain, Neuropathic pain  Pain Location: Finger (Comment which one) (right index)  Pain Descriptors:  (shooting pain.)       Hand Assessment    Right Hand AROM:  R Long  MCP 0-90 (degrees): 82 Degrees  R Long PIP 0-100 (degrees): 111 Degrees  R Long DIP 0-70 (degrees): 64 Degrees  Right Hand PROM:   R Long  MCP 0-90 (degrees): 82 Degrees  R Long PIP 0-100 (degrees): 111 Degrees  R Long DIP 0-70 (degrees): 64 Degrees       Left Hand AROM:  L Index  MCP 0-90 (degrees): 78 Degrees  L Index PIP 0-100 (degrees): 114 Degrees  L Index DIP 0-70 (degrees): 63 Degrees   Left Hand PROM:     Left Hand Strength - :  Handle Setting 2 (lbs): 47.3 lbs  Left Hand Strength - Pinch:  Lateral (lbs): 14.3 lbs            Therapeutic Exercise:   min    MINUTES  Therapeutic Exercise  Therapeutic Exercise Activity 1: IF MP flexion/ Ext x 10  Therapeutic Exercise Activity 2: IF PIP flexion with relax /No active Ext x 10  Therapeutic Exercise Activity 3: IF DIP PROM flexion x 10  Therapeutic Exercise Activity 4: IF DIP PROM flexion with attempted hold x 5  Therapeutic Exercise Activity 5: IF DIP AROM x 10      Therapeutic Activity:        MINUTES  Therapeutic Activity  Therapeutic Activity 1: Rock tape applied over cavalon border to assist with swelling reduction over volar and dorsal MP's  Therapeutic Activity 2: Coban applied around P1 and P3 to decrease swelling in digit  Therapeutic Activity 3: Pt's educated to apply  argenis to from of splint to assist with positioning.    Education  Education  Education Provided: Risk and benefits of OT discussed with patient or other, POC discussed and agreed upon, Edema control, Orthotics wearing schedule and precautions, Orthotics and/or prosthetic trainging  Home Program: Handout issued, Wound/scar care, Tendon gliding, Edema control  Risk and Benefits Discussed with Patient/Caregiver/Other: yes  Patient/Caregiver Demonstrated Understanding: yes  Plan of Care Discussed and Agreed Upon: yes  Patient Response to Education: Patient/Caregiver Verbalized Understanding of Information, Patient/Caregiver Performed Return Demonstration of Exercises/Activities, Patient/Caregiver Asked Appropriate Questions       Alfredo Tracy, OTR/L, CHT        Visits Requested: 7    Active       OT Goals       Pt will demonstrate in improve in function by improving to 20.0 on the quick dash to signify increased independence with ADL'S with decreased pain.         Start:  07/31/25    Expected End:  01/31/26            Patient will improve hand strength in order to perform self-care, household, work and or leisure tasks Right  Hand Strength Goal;      30     #, Key Pinch     12    #       Start:  07/31/25    Expected End:  01/31/26            Patient will improve finger ROM in order to perform self-care, household, work and or leisure tasks, Right Finger ROM Goal (Total Active Motion)      Index   225  Degrees,  Long   262    Degrees,       Start:  07/31/25    Expected End:  01/31/26            Pt will be able to perform ADL, work or leisure with 0-2 /10 pain rating.         Start:  07/31/25    Expected End:  01/31/26            Pt will be independent with HEP including wound management, scar management and edema management         Start:  07/31/25    Expected End:  01/31/26

## 2025-08-11 ENCOUNTER — APPOINTMENT (OUTPATIENT)
Dept: ORTHOPEDIC SURGERY | Facility: CLINIC | Age: 55
End: 2025-08-11
Payer: COMMERCIAL

## 2025-08-11 ENCOUNTER — HOSPITAL ENCOUNTER (OUTPATIENT)
Dept: RADIOLOGY | Facility: HOSPITAL | Age: 55
Discharge: HOME | End: 2025-08-11
Payer: COMMERCIAL

## 2025-08-11 ENCOUNTER — DOCUMENTATION (OUTPATIENT)
Dept: PHYSICAL THERAPY | Facility: CLINIC | Age: 55
End: 2025-08-11

## 2025-08-11 DIAGNOSIS — S63.289A DISLOCATION OF PROXIMAL INTERPHALANGEAL JOINT OF FINGER, INITIAL ENCOUNTER: ICD-10-CM

## 2025-08-11 DIAGNOSIS — S62.629A AVULSION FRACTURE OF MIDDLE PHALANX OF FINGER, CLOSED, INITIAL ENCOUNTER: ICD-10-CM

## 2025-08-11 PROCEDURE — 73130 X-RAY EXAM OF HAND: CPT | Mod: RIGHT SIDE | Performed by: RADIOLOGY

## 2025-08-11 PROCEDURE — 1036F TOBACCO NON-USER: CPT | Performed by: ORTHOPAEDIC SURGERY

## 2025-08-11 PROCEDURE — 73130 X-RAY EXAM OF HAND: CPT | Mod: RT

## 2025-08-11 PROCEDURE — 99024 POSTOP FOLLOW-UP VISIT: CPT | Performed by: ORTHOPAEDIC SURGERY

## 2025-08-11 RX ORDER — CEPHALEXIN 500 MG/1
500 CAPSULE ORAL 3 TIMES DAILY
Qty: 15 CAPSULE | Refills: 0 | Status: SHIPPED | OUTPATIENT
Start: 2025-08-11 | End: 2025-08-16

## 2025-08-11 ASSESSMENT — PAIN - FUNCTIONAL ASSESSMENT: PAIN_FUNCTIONAL_ASSESSMENT: 0-10

## 2025-08-11 ASSESSMENT — PAIN SCALES - GENERAL: PAINLEVEL_OUTOF10: 3

## 2025-08-18 ENCOUNTER — HOSPITAL ENCOUNTER (OUTPATIENT)
Dept: RADIOLOGY | Facility: HOSPITAL | Age: 55
Discharge: HOME | End: 2025-08-18
Payer: COMMERCIAL

## 2025-08-18 DIAGNOSIS — Z13.820 ENCOUNTER FOR SCREENING FOR OSTEOPOROSIS: ICD-10-CM

## 2025-08-18 PROCEDURE — 77080 DXA BONE DENSITY AXIAL: CPT | Performed by: RADIOLOGY

## 2025-08-18 PROCEDURE — 77080 DXA BONE DENSITY AXIAL: CPT

## 2025-08-20 ENCOUNTER — TREATMENT (OUTPATIENT)
Dept: OCCUPATIONAL THERAPY | Facility: CLINIC | Age: 55
End: 2025-08-20
Payer: COMMERCIAL

## 2025-08-20 DIAGNOSIS — M25.641 FINGER STIFFNESS, RIGHT: ICD-10-CM

## 2025-08-20 DIAGNOSIS — M79.644 FINGER PAIN, RIGHT: ICD-10-CM

## 2025-08-20 PROCEDURE — 97110 THERAPEUTIC EXERCISES: CPT | Mod: GO

## 2025-08-20 PROCEDURE — 97035 APP MDLTY 1+ULTRASOUND EA 15: CPT | Mod: GO

## 2025-08-25 ENCOUNTER — HOSPITAL ENCOUNTER (OUTPATIENT)
Dept: RADIOLOGY | Facility: HOSPITAL | Age: 55
Discharge: HOME | End: 2025-08-25
Payer: COMMERCIAL

## 2025-08-25 ENCOUNTER — TREATMENT (OUTPATIENT)
Dept: OCCUPATIONAL THERAPY | Facility: CLINIC | Age: 55
End: 2025-08-25
Payer: COMMERCIAL

## 2025-08-25 ENCOUNTER — APPOINTMENT (OUTPATIENT)
Dept: ORTHOPEDIC SURGERY | Facility: CLINIC | Age: 55
End: 2025-08-25
Payer: COMMERCIAL

## 2025-08-25 ENCOUNTER — DOCUMENTATION (OUTPATIENT)
Dept: OCCUPATIONAL THERAPY | Facility: HOSPITAL | Age: 55
End: 2025-08-25

## 2025-08-25 DIAGNOSIS — S63.289A DISLOCATION OF PROXIMAL INTERPHALANGEAL JOINT OF FINGER, INITIAL ENCOUNTER: ICD-10-CM

## 2025-08-25 DIAGNOSIS — M79.644 FINGER PAIN, RIGHT: ICD-10-CM

## 2025-08-25 DIAGNOSIS — M25.641 FINGER STIFFNESS, RIGHT: ICD-10-CM

## 2025-08-25 PROCEDURE — 73140 X-RAY EXAM OF FINGER(S): CPT | Mod: RIGHT SIDE | Performed by: RADIOLOGY

## 2025-08-25 PROCEDURE — 73140 X-RAY EXAM OF FINGER(S): CPT | Mod: RT

## 2025-08-25 PROCEDURE — 97035 APP MDLTY 1+ULTRASOUND EA 15: CPT | Mod: GO

## 2025-08-25 PROCEDURE — 97110 THERAPEUTIC EXERCISES: CPT | Mod: GO

## 2025-08-25 PROCEDURE — 99024 POSTOP FOLLOW-UP VISIT: CPT | Performed by: ORTHOPAEDIC SURGERY

## 2025-08-25 ASSESSMENT — PAIN - FUNCTIONAL ASSESSMENT: PAIN_FUNCTIONAL_ASSESSMENT: 0-10

## 2025-08-25 ASSESSMENT — PAIN SCALES - GENERAL: PAINLEVEL_OUTOF10: 2

## 2025-08-28 ENCOUNTER — TREATMENT (OUTPATIENT)
Dept: OCCUPATIONAL THERAPY | Facility: CLINIC | Age: 55
End: 2025-08-28
Payer: COMMERCIAL

## 2025-08-28 DIAGNOSIS — M79.644 FINGER PAIN, RIGHT: ICD-10-CM

## 2025-08-28 DIAGNOSIS — M25.641 FINGER STIFFNESS, RIGHT: ICD-10-CM

## 2025-08-28 PROCEDURE — 97140 MANUAL THERAPY 1/> REGIONS: CPT | Mod: GO

## 2025-08-28 PROCEDURE — 97110 THERAPEUTIC EXERCISES: CPT | Mod: GO

## 2025-09-02 ENCOUNTER — TREATMENT (OUTPATIENT)
Dept: OCCUPATIONAL THERAPY | Facility: CLINIC | Age: 55
End: 2025-09-02
Payer: COMMERCIAL

## 2025-09-02 DIAGNOSIS — M79.644 FINGER PAIN, RIGHT: ICD-10-CM

## 2025-09-02 DIAGNOSIS — M25.641 FINGER STIFFNESS, RIGHT: ICD-10-CM

## 2025-09-02 PROCEDURE — 97035 APP MDLTY 1+ULTRASOUND EA 15: CPT | Mod: GO

## 2025-09-02 PROCEDURE — 97110 THERAPEUTIC EXERCISES: CPT | Mod: GO

## 2025-09-02 PROCEDURE — 97140 MANUAL THERAPY 1/> REGIONS: CPT | Mod: GO

## 2025-09-04 ENCOUNTER — TREATMENT (OUTPATIENT)
Dept: OCCUPATIONAL THERAPY | Facility: CLINIC | Age: 55
End: 2025-09-04
Payer: COMMERCIAL

## 2025-09-04 DIAGNOSIS — M79.644 FINGER PAIN, RIGHT: ICD-10-CM

## 2025-09-04 DIAGNOSIS — M25.641 FINGER STIFFNESS, RIGHT: ICD-10-CM

## 2025-09-04 PROCEDURE — 97140 MANUAL THERAPY 1/> REGIONS: CPT | Mod: GO

## 2025-09-04 PROCEDURE — 97110 THERAPEUTIC EXERCISES: CPT | Mod: GO

## 2025-09-15 ENCOUNTER — APPOINTMENT (OUTPATIENT)
Dept: ORTHOPEDIC SURGERY | Facility: CLINIC | Age: 55
End: 2025-09-15
Payer: COMMERCIAL

## (undated) DEVICE — PREP TRAY, SKIN, DRY, W/GLOVES

## (undated) DEVICE — HOLSTER, BOVIE, ES PENCIL, DISP

## (undated) DEVICE — Device

## (undated) DEVICE — SLING, ARM, LARGE

## (undated) DEVICE — CUFF, TOURNIQUET, 18 X 4, SNGL PORT/SNGL BLADDER, DISP, LF

## (undated) DEVICE — COVER, MINI DRAPE SET, C-ARM, FOR OEC/GE

## (undated) DEVICE — PADDING, WEBRIL, UNDERCAST, STERILE, 3 IN

## (undated) DEVICE — BANDAGE, ELASTIC, MATRIX, SELF-CLOSURE, 3 IN X 5 YD, LF